# Patient Record
Sex: FEMALE | Race: BLACK OR AFRICAN AMERICAN | Employment: FULL TIME | URBAN - NONMETROPOLITAN AREA
[De-identification: names, ages, dates, MRNs, and addresses within clinical notes are randomized per-mention and may not be internally consistent; named-entity substitution may affect disease eponyms.]

---

## 2022-11-29 ENCOUNTER — HOSPITAL ENCOUNTER (EMERGENCY)
Age: 47
Discharge: HOME | End: 2022-11-29
Attending: EMERGENCY MEDICINE
Payer: COMMERCIAL

## 2022-11-29 ENCOUNTER — APPOINTMENT (OUTPATIENT)
Dept: GENERAL RADIOLOGY | Age: 47
End: 2022-11-29
Attending: EMERGENCY MEDICINE
Payer: COMMERCIAL

## 2022-11-29 ENCOUNTER — APPOINTMENT (OUTPATIENT)
Dept: CT IMAGING | Age: 47
End: 2022-11-29
Attending: EMERGENCY MEDICINE
Payer: COMMERCIAL

## 2022-11-29 VITALS
HEART RATE: 77 BPM | WEIGHT: 170 LBS | OXYGEN SATURATION: 99 % | TEMPERATURE: 98.2 F | SYSTOLIC BLOOD PRESSURE: 146 MMHG | RESPIRATION RATE: 16 BRPM | BODY MASS INDEX: 32.1 KG/M2 | HEIGHT: 61 IN | DIASTOLIC BLOOD PRESSURE: 99 MMHG

## 2022-11-29 DIAGNOSIS — H81.399 PERIPHERAL VERTIGO, UNSPECIFIED LATERALITY: Primary | ICD-10-CM

## 2022-11-29 LAB
ALBUMIN SERPL-MCNC: 3.6 G/DL (ref 3.4–5)
ALBUMIN/GLOB SERPL: 0.8 {RATIO} (ref 0.8–1.7)
ALP SERPL-CCNC: 65 U/L (ref 45–117)
ALT SERPL-CCNC: 17 U/L (ref 13–56)
ANION GAP SERPL CALC-SCNC: 6 MMOL/L (ref 3–18)
APPEARANCE UR: ABNORMAL
AST SERPL W P-5'-P-CCNC: 13 U/L (ref 10–38)
BACTERIA URNS QL MICRO: ABNORMAL /HPF
BASOPHILS # BLD: 0 K/UL (ref 0–0.1)
BASOPHILS NFR BLD: 0 % (ref 0–2)
BILIRUB SERPL-MCNC: 0.5 MG/DL (ref 0.2–1)
BILIRUB UR QL: ABNORMAL
BUN SERPL-MCNC: 10 MG/DL (ref 7–18)
BUN/CREAT SERPL: 11 (ref 12–20)
CA-I BLD-MCNC: 9.2 MG/DL (ref 8.5–10.1)
CHLORIDE SERPL-SCNC: 104 MMOL/L (ref 100–111)
CO2 SERPL-SCNC: 27 MMOL/L (ref 21–32)
COLOR UR: ABNORMAL
CREAT SERPL-MCNC: 0.95 MG/DL (ref 0.6–1.3)
DIFFERENTIAL METHOD BLD: ABNORMAL
EOSINOPHIL # BLD: 0 K/UL (ref 0–0.4)
EOSINOPHIL NFR BLD: 1 % (ref 0–5)
EPITH CASTS URNS QL MICRO: ABNORMAL /LPF (ref 0–20)
ERYTHROCYTE [DISTWIDTH] IN BLOOD BY AUTOMATED COUNT: 12.1 % (ref 11.6–14.5)
GLOBULIN SER CALC-MCNC: 4.6 G/DL (ref 2–4)
GLUCOSE SERPL-MCNC: 123 MG/DL (ref 74–99)
GLUCOSE UR STRIP.AUTO-MCNC: NEGATIVE MG/DL
HCT VFR BLD AUTO: 42.3 % (ref 35–45)
HGB BLD-MCNC: 13.6 G/DL (ref 12–16)
HGB UR QL STRIP: ABNORMAL
IMM GRANULOCYTES # BLD AUTO: 0 K/UL (ref 0–0.04)
IMM GRANULOCYTES NFR BLD AUTO: 0 % (ref 0–0.5)
KETONES UR QL STRIP.AUTO: NEGATIVE MG/DL
LEUKOCYTE ESTERASE UR QL STRIP.AUTO: ABNORMAL
LYMPHOCYTES # BLD: 1.1 K/UL (ref 0.9–3.6)
LYMPHOCYTES NFR BLD: 14 % (ref 21–52)
MAGNESIUM SERPL-MCNC: 1.9 MG/DL (ref 1.6–2.6)
MCH RBC QN AUTO: 31.2 PG (ref 24–34)
MCHC RBC AUTO-ENTMCNC: 32.2 G/DL (ref 31–37)
MCV RBC AUTO: 97 FL (ref 78–100)
MONOCYTES # BLD: 0.2 K/UL (ref 0.05–1.2)
MONOCYTES NFR BLD: 3 % (ref 3–10)
NEUTS SEG # BLD: 6.9 K/UL (ref 1.8–8)
NEUTS SEG NFR BLD: 82 % (ref 40–73)
NITRITE UR QL STRIP.AUTO: POSITIVE
NRBC # BLD: 0 K/UL (ref 0–0.01)
NRBC BLD-RTO: 0 PER 100 WBC
PH UR STRIP: 5 [PH] (ref 5–8)
PLATELET # BLD AUTO: 241 K/UL (ref 135–420)
PMV BLD AUTO: 11.6 FL (ref 9.2–11.8)
POTASSIUM SERPL-SCNC: 3.8 MMOL/L (ref 3.5–5.5)
PROT SERPL-MCNC: 8.2 G/DL (ref 6.4–8.2)
PROT UR STRIP-MCNC: 100 MG/DL
RBC # BLD AUTO: 4.36 M/UL (ref 4.2–5.3)
RBC #/AREA URNS HPF: >100 /HPF (ref 0–2)
SODIUM SERPL-SCNC: 137 MMOL/L (ref 136–145)
SP GR UR REFRACTOMETRY: 1.01 (ref 1–1.03)
UROBILINOGEN UR QL STRIP.AUTO: 0.2 EU/DL (ref 0.2–1)
WBC # BLD AUTO: 8.3 K/UL (ref 4.6–13.2)
WBC URNS QL MICRO: ABNORMAL /HPF (ref 0–4)

## 2022-11-29 PROCEDURE — 93005 ELECTROCARDIOGRAM TRACING: CPT

## 2022-11-29 PROCEDURE — 71045 X-RAY EXAM CHEST 1 VIEW: CPT

## 2022-11-29 PROCEDURE — 81001 URINALYSIS AUTO W/SCOPE: CPT

## 2022-11-29 PROCEDURE — 87086 URINE CULTURE/COLONY COUNT: CPT

## 2022-11-29 PROCEDURE — 74011250636 HC RX REV CODE- 250/636: Performed by: EMERGENCY MEDICINE

## 2022-11-29 PROCEDURE — 70450 CT HEAD/BRAIN W/O DYE: CPT

## 2022-11-29 PROCEDURE — 74011000258 HC RX REV CODE- 258: Performed by: EMERGENCY MEDICINE

## 2022-11-29 PROCEDURE — 83735 ASSAY OF MAGNESIUM: CPT

## 2022-11-29 PROCEDURE — 96375 TX/PRO/DX INJ NEW DRUG ADDON: CPT

## 2022-11-29 PROCEDURE — 80053 COMPREHEN METABOLIC PANEL: CPT

## 2022-11-29 PROCEDURE — 99285 EMERGENCY DEPT VISIT HI MDM: CPT

## 2022-11-29 PROCEDURE — 96365 THER/PROPH/DIAG IV INF INIT: CPT

## 2022-11-29 PROCEDURE — 85025 COMPLETE CBC W/AUTO DIFF WBC: CPT

## 2022-11-29 PROCEDURE — 74011250637 HC RX REV CODE- 250/637: Performed by: EMERGENCY MEDICINE

## 2022-11-29 PROCEDURE — 96361 HYDRATE IV INFUSION ADD-ON: CPT

## 2022-11-29 RX ORDER — DIAZEPAM 5 MG/1
5 TABLET ORAL
Status: COMPLETED | OUTPATIENT
Start: 2022-11-29 | End: 2022-11-29

## 2022-11-29 RX ORDER — MECLIZINE HYDROCHLORIDE 25 MG/1
25 TABLET ORAL
Qty: 20 TABLET | Refills: 0 | Status: SHIPPED | OUTPATIENT
Start: 2022-11-29 | End: 2022-12-06

## 2022-11-29 RX ORDER — MECLIZINE HCL 12.5 MG 12.5 MG/1
25 TABLET ORAL
Status: COMPLETED | OUTPATIENT
Start: 2022-11-29 | End: 2022-11-29

## 2022-11-29 RX ORDER — DIAZEPAM 5 MG/1
5 TABLET ORAL
Qty: 8 TABLET | Refills: 0 | Status: SHIPPED | OUTPATIENT
Start: 2022-11-29

## 2022-11-29 RX ORDER — ONDANSETRON 4 MG/1
4 TABLET, ORALLY DISINTEGRATING ORAL
Qty: 10 TABLET | Refills: 0 | Status: SHIPPED | OUTPATIENT
Start: 2022-11-29

## 2022-11-29 RX ORDER — ONDANSETRON 2 MG/ML
4 INJECTION INTRAMUSCULAR; INTRAVENOUS
Status: COMPLETED | OUTPATIENT
Start: 2022-11-29 | End: 2022-11-29

## 2022-11-29 RX ORDER — SULFAMETHOXAZOLE AND TRIMETHOPRIM 800; 160 MG/1; MG/1
1 TABLET ORAL 2 TIMES DAILY
Qty: 14 TABLET | Refills: 0 | Status: SHIPPED | OUTPATIENT
Start: 2022-11-29 | End: 2022-12-01

## 2022-11-29 RX ADMIN — SODIUM CHLORIDE 1000 ML: 9 INJECTION, SOLUTION INTRAVENOUS at 12:47

## 2022-11-29 RX ADMIN — CEFTRIAXONE 1 G: 1 INJECTION, POWDER, FOR SOLUTION INTRAMUSCULAR; INTRAVENOUS at 16:30

## 2022-11-29 RX ADMIN — MECLIZINE 25 MG: 12.5 TABLET ORAL at 16:30

## 2022-11-29 RX ADMIN — SODIUM CHLORIDE 1000 ML: 9 INJECTION, SOLUTION INTRAVENOUS at 16:30

## 2022-11-29 RX ADMIN — DIAZEPAM 5 MG: 5 TABLET ORAL at 14:47

## 2022-11-29 RX ADMIN — ONDANSETRON 4 MG: 2 INJECTION INTRAMUSCULAR; INTRAVENOUS at 12:46

## 2022-11-29 RX ADMIN — MECLIZINE 25 MG: 12.5 TABLET ORAL at 12:47

## 2022-11-29 NOTE — LETTER
[facility] EMERGENCY DEPT  [address] [address]  [phone]    Work/School Note    Date: 11/29/2022    To Whom It May concern:    Broderick Graham was seen and treated today in the emergency room by the following provider(s):  No providers found. Broderick Graham is excused from work/school on 11/29/2022 through 12/1/2022. She is medically clear to return to work/school on 12/2/2022.      [unfilled]    Sincerely,          Omid Izquierdo

## 2022-11-29 NOTE — ED NOTES
Pt c/o being dizzy while standing to do orthostatic B/P, pt expressed concern for taking meds \"on an empty stomach\", given crackers to pt.  Family at bedside assisting pt with needs

## 2022-11-29 NOTE — LETTER
[facility] EMERGENCY DEPT  [address] [id number] [phone]    Work/School Note    Date: 11/29/2022    To Whom It May concern:    Luisa Nick was seen and treated today in the emergency room by the following provider(s):  Attending Provider: Jimmy Bills MD.      Luisa Nick is excused from work/school on 11/29/22 and 11/30/22. She is medically clear to return to work/school on 12/1/2022.    [unfilled]    Sincerely,          Moses Roach

## 2022-11-30 LAB
ATRIAL RATE: 74 BPM
CALCULATED P AXIS, ECG09: 46 DEGREES
CALCULATED R AXIS, ECG10: 7 DEGREES
CALCULATED T AXIS, ECG11: 36 DEGREES
DIAGNOSIS, 93000: NORMAL
P-R INTERVAL, ECG05: 114 MS
Q-T INTERVAL, ECG07: 396 MS
QRS DURATION, ECG06: 83 MS
QTC CALCULATION (BEZET), ECG08: 446 MS
VENTRICULAR RATE, ECG03: 76 BPM

## 2022-12-01 ENCOUNTER — TELEPHONE (OUTPATIENT)
Dept: FAMILY MEDICINE CLINIC | Age: 47
End: 2022-12-01

## 2022-12-01 ENCOUNTER — OFFICE VISIT (OUTPATIENT)
Dept: FAMILY MEDICINE CLINIC | Age: 47
End: 2022-12-01
Payer: COMMERCIAL

## 2022-12-01 VITALS
RESPIRATION RATE: 18 BRPM | SYSTOLIC BLOOD PRESSURE: 142 MMHG | HEIGHT: 61 IN | TEMPERATURE: 98.4 F | HEART RATE: 85 BPM | OXYGEN SATURATION: 99 % | BODY MASS INDEX: 32.1 KG/M2 | DIASTOLIC BLOOD PRESSURE: 84 MMHG | WEIGHT: 170 LBS

## 2022-12-01 DIAGNOSIS — R42 DIZZINESS: ICD-10-CM

## 2022-12-01 DIAGNOSIS — Z09 HOSPITAL DISCHARGE FOLLOW-UP: ICD-10-CM

## 2022-12-01 DIAGNOSIS — Z56.0 OUT OF WORK: ICD-10-CM

## 2022-12-01 DIAGNOSIS — N39.0 URINARY TRACT INFECTION WITHOUT HEMATURIA, SITE UNSPECIFIED: Primary | ICD-10-CM

## 2022-12-01 PROBLEM — Z98.890 HX OF LEFT KNEE SURGERY: Status: ACTIVE | Noted: 2022-12-01

## 2022-12-01 LAB
BACTERIA SPEC CULT: NORMAL
COLONY COUNT,CNT: NORMAL
COLONY COUNT,CNT: NORMAL
SPECIAL REQUESTS,SREQ: NORMAL

## 2022-12-01 PROCEDURE — 99214 OFFICE O/P EST MOD 30 MIN: CPT | Performed by: NURSE PRACTITIONER

## 2022-12-01 RX ORDER — ERGOCALCIFEROL 1.25 MG/1
CAPSULE ORAL
COMMUNITY

## 2022-12-01 RX ORDER — PREDNISONE 20 MG/1
TABLET ORAL
COMMUNITY
Start: 2022-11-29

## 2022-12-01 RX ORDER — CIPROFLOXACIN 500 MG/1
500 TABLET ORAL 2 TIMES DAILY
Qty: 20 TABLET | Refills: 0 | Status: SHIPPED | OUTPATIENT
Start: 2022-12-01 | End: 2022-12-01 | Stop reason: ALTCHOICE

## 2022-12-01 RX ORDER — MECLIZINE HCL 12.5 MG 12.5 MG/1
TABLET ORAL
COMMUNITY
Start: 2022-11-29 | End: 2022-12-05 | Stop reason: DRUGHIGH

## 2022-12-01 RX ORDER — NITROFURANTOIN 25; 75 MG/1; MG/1
100 CAPSULE ORAL 2 TIMES DAILY
Qty: 20 CAPSULE | Refills: 0 | Status: SHIPPED | OUTPATIENT
Start: 2022-12-01

## 2022-12-01 SDOH — ECONOMIC STABILITY - INCOME SECURITY: UNEMPLOYMENT, UNSPECIFIED: Z56.0

## 2022-12-01 NOTE — TELEPHONE ENCOUNTER
Patients  called stating the cipro you prescribed for her today the pills are to big and she has a fear of large pills, he wanted to know if there was something smaller you could prescribe for her.      Husbands call back info; [phone]

## 2022-12-01 NOTE — PROGRESS NOTES
History of Present Illness  Angela Lay is a 49 y.o. female who presents today for:    Chief Complaint   Patient presents with    Dizziness     Pt has been having dizziness since 11/29/2022 pt also has been feeling weak feeling as if she is going to pass out and pain in left arm. Past Medical History  History reviewed. No pertinent past medical history. Surgical History  Past Surgical History:   Procedure Laterality Date    HX GYN      HX ORTHOPAEDIC          Current Medications  Current Outpatient Medications   Medication Sig    ergocalciferol (ERGOCALCIFEROL) 1,250 mcg (50,000 unit) capsule     predniSONE (DELTASONE) 20 mg tablet TAKE 3 TABLETS BY MOUTH EVERY MORNING FOR 3 DAYS THEN TAKE 2 TABLETS EVERY MORNING FOR 3 DAYS THEN TAKE 1 TABLET EVERY MORNING FOR 3 DAYS    nitrofurantoin, macrocrystal-monohydrate, (MACROBID) 100 mg capsule Take 1 Capsule by mouth two (2) times a day. meclizine (ANTIVERT) 25 mg tablet Take 1 Tablet by mouth three (3) times daily as needed for Dizziness for up to 7 days. diazePAM (Valium) 5 mg tablet Take 1 Tablet by mouth every twelve (12) hours as needed for Anxiety. Max Daily Amount: 10 mg.    ondansetron (ZOFRAN ODT) 4 mg disintegrating tablet Take 1 Tablet by mouth every eight (8) hours as needed for Nausea or Vomiting.    meclizine (ANTIVERT) 12.5 mg tablet TAKE 1 TO 2 TABLETS BY MOUTH EVERY 6 HOURS AS NEEDED FOR VERTIGO (Patient not taking: Reported on 12/1/2022)     No current facility-administered medications for this visit. Allergies/Drug Reactions  No Known Allergies     Family History  History reviewed. No pertinent family history.      Social History  Social History     Tobacco Use    Smoking status: Never    Smokeless tobacco: Never   Substance Use Topics    Alcohol use: Not Currently    Drug use: Not Currently        Health Maintenance   Topic Date Due    Hepatitis C Screening  Never done    COVID-19 Vaccine (1) Never done    DTaP/Tdap/Td series (1 - Tdap) Never done    Lipid Screen  Never done    Colorectal Cancer Screening Combo  Never done    Flu Vaccine (1) Never done    Depression Screen  12/01/2023    Cervical cancer screen  12/09/2024    Pneumococcal 0-64 years  Aged Out     Physical Exam  Vital signs:   Vitals:    12/01/22 1310 12/01/22 1313   BP: (!) 144/82 (!) 142/84   Pulse: 80 85   Resp: 18 18   Temp: 98.4 °F (36.9 °C)    TempSrc: Temporal    SpO2:  99%   Weight: 170 lb (77.1 kg)    Height: 5' 1\" (1.549 m)      Laboratory/Tests:  Admission on 11/29/2022, Discharged on 11/29/2022   Component Date Value Ref Range Status    WBC 11/29/2022 8.3  4.6 - 13.2 K/uL Final    RBC 11/29/2022 4.36  4.20 - 5.30 M/uL Final    HGB 11/29/2022 13.6  12.0 - 16.0 g/dL Final    HCT 11/29/2022 42.3  35.0 - 45.0 % Final    MCV 11/29/2022 97.0  78.0 - 100.0 FL Final    MCH 11/29/2022 31.2  24.0 - 34.0 PG Final    MCHC 11/29/2022 32.2  31.0 - 37.0 g/dL Final    RDW 11/29/2022 12.1  11.6 - 14.5 % Final    PLATELET 98/53/5197 592  135 - 420 K/uL Final    MPV 11/29/2022 11.6  9.2 - 11.8 FL Final    NRBC 11/29/2022 0.0  0.0  WBC Final    ABSOLUTE NRBC 11/29/2022 0.00  0.00 - 0.01 K/uL Final    NEUTROPHILS 11/29/2022 82 (A)  40 - 73 % Final    LYMPHOCYTES 11/29/2022 14 (A)  21 - 52 % Final    MONOCYTES 11/29/2022 3  3 - 10 % Final    EOSINOPHILS 11/29/2022 1  0 - 5 % Final    BASOPHILS 11/29/2022 0  0 - 2 % Final    IMMATURE GRANULOCYTES 11/29/2022 0  0 - 0.5 % Final    ABS. NEUTROPHILS 11/29/2022 6.9  1.8 - 8.0 K/UL Final    ABS. LYMPHOCYTES 11/29/2022 1.1  0.9 - 3.6 K/UL Final    ABS. MONOCYTES 11/29/2022 0.2  0.05 - 1.2 K/UL Final    ABS. EOSINOPHILS 11/29/2022 0.0  0.0 - 0.4 K/UL Final    ABS. BASOPHILS 11/29/2022 0.0  0.0 - 0.1 K/UL Final    ABS. IMM.  GRANS. 11/29/2022 0.0  0.00 - 0.04 K/UL Final    DF 11/29/2022 AUTOMATED    Final    Magnesium 11/29/2022 1.9  1.6 - 2.6 mg/dL Final    Sodium 11/29/2022 137  136 - 145 mmol/L Final    Potassium 11/29/2022 3.8  3.5 - 5.5 mmol/L Final    Chloride 11/29/2022 104  100 - 111 mmol/L Final    CO2 11/29/2022 27  21 - 32 mmol/L Final    Anion gap 11/29/2022 6  3.0 - 18.0 mmol/L Final    Glucose 11/29/2022 123 (A)  74 - 99 mg/dL Final    BUN 11/29/2022 10  7 - 18 mg/dL Final    Creatinine 11/29/2022 0.95  0.60 - 1.30 mg/dL Final    BUN/Creatinine ratio 11/29/2022 11 (A)  12 - 20   Final    eGFR 11/29/2022 >60  >60 ml/min/1.73m2 Final    Comment:    Pediatric calculator link: [employer].at. org/professionals/kdoqi/gfr_calculatorped    Effective Oct 3, 2022    These results are not intended for use in patients <25years of age. eGFR results are calculated without a race factor using  the 2021 CKD-EPI equation. Careful clinical correlation is recommended, particularly when comparing to results calculated using previous equations. The CKD-EPI equation is less accurate in patients with extremes of muscle mass, extra-renal metabolism of creatinine, excessive creatine ingestion, or following therapy that affects renal tubular secretion. Calcium 11/29/2022 9.2  8.5 - 10.1 mg/dL Final    Bilirubin, total 11/29/2022 0.5  0.2 - 1.0 mg/dL Final    AST (SGOT) 11/29/2022 13  10 - 38 U/L Final    ALT (SGPT) 11/29/2022 17  13 - 56 U/L Final    Alk.  phosphatase 11/29/2022 65  45 - 117 U/L Final    Protein, total 11/29/2022 8.2  6.4 - 8.2 g/dL Final    Albumin 11/29/2022 3.6  3.4 - 5.0 g/dL Final    Globulin 11/29/2022 4.6 (A)  2.0 - 4.0 g/dL Final    A-G Ratio 11/29/2022 0.8  0.8 - 1.7   Final    Color 11/29/2022 Red    Final    Color Reference Range: Straw, Yellow or Dark Yellow    Appearance 11/29/2022 Turbid    Final    Specific gravity 11/29/2022 1.010  1.005 - 1.030   Final    pH (UA) 11/29/2022 5.0  5.0 - 8.0   Final    Protein 11/29/2022 100 (A)  Negative mg/dL Final    Glucose 11/29/2022 Negative  Negative mg/dL Final    Ketone 11/29/2022 Negative  Negative mg/dL Final    Bilirubin 11/29/2022 Moderate (A)  Negative Final    Blood 11/29/2022 Moderate (A)  Negative   Final    Urobilinogen 11/29/2022 0.2  0.2 - 1.0 EU/dL Final    Nitrites 11/29/2022 Positive (A)  Negative   Final    Leukocyte Esterase 11/29/2022 Large (A)  Negative   Final    Ventricular Rate 11/29/2022 76  BPM Final    Atrial Rate 11/29/2022 74  BPM Final    P-R Interval 11/29/2022 114  ms Final    QRS Duration 11/29/2022 83  ms Final    Q-T Interval 11/29/2022 396  ms Final    QTC Calculation (Bezet) 11/29/2022 446  ms Final    Calculated P Axis 11/29/2022 46  degrees Final    Calculated R Axis 11/29/2022 7  degrees Final    Calculated T Axis 11/29/2022 36  degrees Final    Diagnosis 11/29/2022    Final                    Value:Sinus rhythm  Borderline short MD interval    Confirmed by Toya Weaver ([id number]) on 11/30/2022 4:31:23 PM      WBC 11/29/2022 5-10  0 - 4 /hpf Final    RBC 11/29/2022 >100 (A)  0 - 2 /hpf Final    Epithelial cells 11/29/2022 Moderate  0 - 20 /lpf Final    Epithelial cell category consists of squamous cells and/or transitional urothelial cells. Renal tubular cells, if present, are separately identified as such. Bacteria 11/29/2022 2+ (A)  None /hpf Final    Special Requests: 11/29/2022 No Special Requests    Final    [place] Count 11/29/2022 50,000    Final    [place] Count 11/29/2022 colonies/ml    Final    Culture result: 11/29/2022 Mixed urogenital billie isolated    Final     Patient reported to ED on 11/29/2022 with chief complaint of dizziness with 2-day history. She reported the room felt like it was spinning. She also reported fullness in ears. Scan on 11/29/2022 of head revealed no abnormalities other than white matter disease. Chest x-ray was negative for acute process. During ED visit patient symptoms improved with hydration, Meclizine and Valium. At discharge she reported she was slightly dizzy but stable enough to return home.   Patient was given out of work note for 3 days and advised if she needed a work note for greater than 30 days she would need to follow-up with PCP. Patient's urine was found to be positive for leukocyte esterase and Nitrates on 11/29/2022 during ED visit and she was prescribed Bactrim 160/800 mg tablet twice daily for 7 days. Assessment/Plan:    Hospital discharge follow-up. Performed hospital discharge follow-up and medication reconciliation. Dizziness. Continue Meclizine 12.5 mg tablet 3 times daily as needed for management of dizziness. Patient referred to ENT at this visit. UTI. Prescribed Nitrofurantoin 100 mg capsule twice daily for management of UTI. Out of work. Drafted, signed and given to the patient during visit and examination room out of work note for patient return to work on 12/19/2022. I have discussed the diagnosis with the patient and the intended plan as seen in the above orders. The patient has received an after-visit summary and questions were answered concerning future plans. I have discussed medication side effects and warnings with the patient as well. I have reviewed the plan of care with the patient, accepted their input and they are in agreement with the treatment goals.        Antonio Powell NP  December 5, 2022

## 2022-12-01 NOTE — LETTER
NOTIFICATION RETURN TO WORK    12/1/2022 1:55 PM    Ms. [address] [place] [address]      To Whom It May Concern:    Marrianne Schilder is currently under the care of [address] [place] [address]/[facility]. She will return to work December 19, 2022. If there are questions or concerns please have the patient contact our office.         Sincerely,      Laurel March NP

## 2022-12-01 NOTE — LETTER
NOTIFICATION RETURN TO WORK    12/16/2022 10:10 AM    Ms. [address] [address]      To Whom It May Concern:    Contreras Nichole is currently under the care of [address] [place] [address]/[facility]. She will return to work on January 9, 2023. If there are questions or concerns please have the patient contact our office.         Sincerely,      Graciela Fontenot NP

## 2022-12-01 NOTE — PROGRESS NOTES
Contreras Nichole presents today for   Chief Complaint   Patient presents with    Dizziness     Pt has been having dizziness since 11/29/2022 pt also has been feeling weak feeling as if she is going to pass out and pain in left arm. Is someone accompanying this pt? Yes,      Is the patient using any DME equipment during [address]? wheelchair    Depression Screening:  3 most recent PHQ Screens 12/1/2022   Little interest or pleasure in doing things Not at all   Feeling down, depressed, irritable, or hopeless Not at all   Total Score PHQ 2 0       Learning Assessment:  No flowsheet data found. Health Maintenance reviewed and discussed and ordered per Provider. Health Maintenance Due   Topic Date Due    Hepatitis C Screening  Never done    Depression Screen  Never done    COVID-19 Vaccine (1) Never done    DTaP/Tdap/Td series (1 - Tdap) Never done    Lipid Screen  Never done    Colorectal Cancer Screening Combo  Never done    Flu Vaccine (1) Never done   . Coordination of Care:  1. \"Have you been to the ER, urgent care clinic since your last visit? Hospitalized since your last visit? \" Yes When: 11/29/2022 Where: [facility] Reason for visit: dizziness     2. \"Have you seen or consulted any other health care providers outside of the [address] [place] [address] since your last visit? \" No     3. For patients aged 39-70: Has the patient had a colonoscopy? No     If the patient is female:    4. For patients aged 41-77: Has the patient had a mammogram within the past 2 years? No    5. For patients aged 21-65: Has the patient had a pap smear? Yes - Care Gap present.  Most recent result on file

## 2022-12-01 NOTE — ED PROVIDER NOTES
EMERGENCY DEPARTMENT HISTORY AND PHYSICAL EXAM      Date: 11/29/2022  Patient Name: Etelvina Taylor    History of Presenting Illness     Chief Complaint   Patient presents with    Dizziness       History Provided By: Patient and Patient's     HPI: Etelvina Taylor, 49 y.o. female presents to ED via EMS complaining of dizziness. Patient reports that she has been having dizziness since 2 days ago. Today she states that she woke up very dizzy, describes that the dizziness is spinning. She states that she was unable to walk by herself, she thinks she may have passed out. Dizziness associated with mild headache, no visual changes, no nausea or vomiting. She also reports fullness in the ears with no pain. She denies sinus pain. She denies fever, chills, numbness or tingling of hands or feet. There are no other complaints, changes, or physical findings at this time. Past History   Past Medical History:  History reviewed. No pertinent past medical history. Past Surgical History:  Past Surgical History:   Procedure Laterality Date    HX GYN      HX ORTHOPAEDIC         Family History:  History reviewed. No pertinent family history. Social History:  Social History     Tobacco Use    Smoking status: Never    Smokeless tobacco: Never   Substance Use Topics    Alcohol use: Not Currently    Drug use: Not Currently       Allergies:  No Known Allergies    PCP: Booker Tian NP    Current Outpatient Medications   Medication Sig Dispense Refill    meclizine (ANTIVERT) 25 mg tablet Take 1 Tablet by mouth three (3) times daily as needed for Dizziness for up to 7 days. 20 Tablet 0    diazePAM (Valium) 5 mg tablet Take 1 Tablet by mouth every twelve (12) hours as needed for Anxiety. Max Daily Amount: 10 mg. 8 Tablet 0    ondansetron (ZOFRAN ODT) 4 mg disintegrating tablet Take 1 Tablet by mouth every eight (8) hours as needed for Nausea or Vomiting.  10 Tablet 0 trimethoprim-sulfamethoxazole (Bactrim DS) 160-800 mg per tablet Take 1 Tablet by mouth two (2) times a day for 7 days. 14 Tablet 0     Review of Systems   Review of Systems   Constitutional:  Positive for activity change and fatigue. Negative for chills and fever. HENT:  Negative for congestion and sore throat. Respiratory:  Negative for cough and shortness of breath. Cardiovascular:  Negative for chest pain. Gastrointestinal:  Negative for abdominal distention, nausea and vomiting. Genitourinary:  Negative for difficulty urinating, dysuria, flank pain, frequency, hematuria, urgency, vaginal bleeding and vaginal discharge. Musculoskeletal:  Negative for arthralgias and joint swelling. Skin:  Negative for rash and wound. Neurological:  Positive for dizziness and weakness. Negative for light-headedness and headaches. Hematological:  Negative for adenopathy. Physical Exam   Physical Exam  Vitals and nursing note reviewed. Constitutional:       General: She is not in acute distress. Appearance: She is well-developed. She is not diaphoretic. Comments: Patient appears in no acute distress. Nontoxic. HENT:      Head: Normocephalic and atraumatic. Jaw: No trismus. Right Ear: External ear normal. No swelling or tenderness. Tympanic membrane is not perforated, erythematous or bulging. Left Ear: External ear normal. No swelling or tenderness. Tympanic membrane is not perforated, erythematous or bulging. Ears:      Comments: Patient reports she feels fullness in ears. Nose: Nose normal. No mucosal edema or rhinorrhea. Right Sinus: No maxillary sinus tenderness or frontal sinus tenderness. Left Sinus: No maxillary sinus tenderness or frontal sinus tenderness. Mouth/Throat:      Mouth: No oral lesions. Dentition: No dental abscesses. Pharynx: Uvula midline. No oropharyngeal exudate, posterior oropharyngeal erythema or uvula swelling. Tonsils: No tonsillar abscesses. Eyes:      General: No scleral icterus. Right eye: No discharge. Left eye: No discharge. Conjunctiva/sclera: Conjunctivae normal.      Comments: Bilateral horizontal nystagmus   Cardiovascular:      Rate and Rhythm: Normal rate and regular rhythm. Heart sounds: Normal heart sounds. No murmur heard. No friction rub. No gallop. Pulmonary:      Effort: Pulmonary effort is normal. No tachypnea, accessory muscle usage or respiratory distress. Breath sounds: Normal breath sounds. No decreased breath sounds, wheezing, rhonchi or rales. Abdominal:      General: Bowel sounds are normal.      Palpations: Abdomen is soft. Musculoskeletal:         General: No tenderness. Normal range of motion. Cervical back: Normal range of motion and neck supple. Lymphadenopathy:      Cervical: No cervical adenopathy. Skin:     General: Skin is warm and dry. Findings: No erythema or rash. Neurological:      General: No focal deficit present. Mental Status: She is alert and oriented to person, place, and time. Cranial Nerves: No cranial nerve deficit. Sensory: No sensory deficit. Psychiatric:         Judgment: Judgment normal.       Lab and Diagnostic Study Results   Labs -   No results found for this or any previous visit (from the past 12 hour(s)). Radiologic Studies -   [unfilled]  CT Results  (Last 48 hours)                 11/29/22 1315  CT HEAD WO CONT Final result    Impression:      White matter disease greater than expected for age. This is most commonly seen   in the setting of chronic systemic hypertension. Otherwise negative. Narrative:  EXAM: CT HEAD WO CONT       CLINICAL INDICATION/HISTORY: syncope/dizziness     > Additional: None       COMPARISON: None.     > Correlative imaging: None. TECHNIQUE: Volumetric scanning without IV contrast.  Sagittal and coronal   reconstructions.  One or more dose reduction techniques were used on this CT:   automated exposure control, adjustment of the mAs and/or kVp according to   patient size, and iterative reconstruction techniques. The specific techniques   used on this CT exam have been documented in the patient's electronic medical   record. Digital imaging and communications in medicine (DICOM) format image data   are available to nonaffiliated external healthcare facilities or entities on a   secure, media free, reciprocally searchable basis with patient authorization for   at least a 12 month period after this study. _______________       FINDINGS:       BRAIN:      > Brain volume: Age appropriate.      > White matter disease:         > Severity: Moderate.         > Relative to age: Greater than expected for age.     > Infarcts, encephalomalacia: None.     > Parenchymal mass: None.      > Parenchymal hemorrhage: None.      > Midline shift: None.      > Miscellaneous: None. EXTRA-AXIAL SPACES: Unremarkable. No fluid collections. CALVARIUM: Intact. SINUSES, MASTOIDS: Clear. OTHER EXTRACRANIAL: Unremarkable.       _______________                 CXR Results  (Last 48 hours)                 11/29/22 1319  XR CHEST PORT Final result    Impression:      No significant abnormality. Narrative:  History: Dizziness   Views: AP erect 1312 hours. Comparison study: None. Findings:       Heart and mediastinum: Unremarkable. Lungs and pleura: Clear without consolidation or pleural effusion. Aorta: Unremarkable. Bones: Minimal levoscoliosis thoracic spine. Medical Decision Making and ED Course   Differential Diagnosis & Medical Decision Making Provider Note:   Peripheral vertigo, vestibulitis, viral illness, complex migraine, TIA, neuropathy, medication reaction    - I am the first and primary provider for this patient.  I reviewed the vital signs, available nursing notes, past medical history, past surgical history, family history and social history. The patient's presenting problems have been discussed, and the staff are in agreement with the care plan formulated and outlined with them. I have encouraged them to ask questions as they arise throughout their visit. Vital Signs-Reviewed the patient's vital signs. No data found. EKG interpretation: (Preliminary): EKG Interpreted by me. Shows EKG: normal EKG, normal sinus rhythm, there are no previous tracings available for comparison, Rate 76. ED Course:      Patient presents with 2 days of spinning kind dizziness. She his bilateral nystagmus on exam.  CT scan of head is unremarkable. Patient improved with hydration, meclizine as well as Valium. She still slightly dizzy at discharge. However stable for discharge home. She is to follow-up with PCP and was given precautions for returning to ED. Patient wanting 1 week work note which I could not provide, I was able to give her 3 days and advised her to follow-up with PCP for more days if she needed them. Procedures and Critical Care       Disposition   Disposition: Condition stable and improved  DC- Adult Discharges: All of the diagnostic tests were reviewed and questions answered. Diagnosis, care plan and treatment options were discussed. The patient understands the instructions and will follow up as directed. The patients results have been reviewed with them. They have been counseled regarding their diagnosis. The patient verbally convey understanding and agreement of the signs, symptoms, diagnosis, treatment and prognosis and additionally agrees to follow up as recommended with their PCP in 24 - 48 hours. They also agree with the care-plan and convey that all of their questions have been answered.   I have also put together some discharge instructions for them that include: 1) educational information regarding their diagnosis, 2) how to care for their diagnosis at home, as well a 3) list of reasons why they would want to return to the ED prior to their follow-up appointment, should their condition change. DISCHARGE PLAN:  1. Cannot display discharge medications since this patient is not currently admitted. 2.   Follow-up Information       Follow up With Specialties Details Why Contact Info    Kathleen Fernández NP Nurse Practitioner In 2 days  [id number] [facility]      Dale Rodriguez MD Neurology In 1 day  [address]  [address] [place] [address] EMERGENCY DEPT Emergency Medicine  If symptoms worsen [place] [id number]  [phone]          3. Return to ED if worse   4. Discharge Medication List as of 11/29/2022  6:14 PM        START taking these medications    Details   meclizine (ANTIVERT) 25 mg tablet Take 1 Tablet by mouth three (3) times daily as needed for Dizziness for up to 7 days. , Normal, Disp-20 Tablet, R-0      diazePAM (Valium) 5 mg tablet Take 1 Tablet by mouth every twelve (12) hours as needed for Anxiety. Max Daily Amount: 10 mg., Normal, Disp-8 Tablet, R-0      ondansetron (ZOFRAN ODT) 4 mg disintegrating tablet Take 1 Tablet by mouth every eight (8) hours as needed for Nausea or Vomiting., Normal, Disp-10 Tablet, R-0      trimethoprim-sulfamethoxazole (Bactrim DS) 160-800 mg per tablet Take 1 Tablet by mouth two (2) times a day for 7 days. , Normal, Disp-14 Tablet, R-0           Remove if admitted/transferred    Diagnosis/Clinical Impression     Clinical Impression:   1. Peripheral vertigo, unspecified laterality        Attestations: Tuan CHAUDHRY MD, am the primary clinician of record. Please note that this dictation was completed with Qwite, the [employer] voice recognition software. Quite often unanticipated grammatical, syntax, homophones, and other interpretive errors are inadvertently transcribed by the computer software. Please disregard these errors. Please excuse any errors that have escaped final proofreading. Thank you.

## 2022-12-05 DIAGNOSIS — R42 DIZZINESS: Primary | ICD-10-CM

## 2022-12-05 RX ORDER — MECLIZINE HYDROCHLORIDE 25 MG/1
25 TABLET ORAL
Qty: 60 TABLET | Refills: 0 | Status: SHIPPED | OUTPATIENT
Start: 2022-12-05 | End: 2022-12-25

## 2022-12-05 NOTE — TELEPHONE ENCOUNTER
Refilled patient's Meclizine 25 mg tablet take 1 tablet 3 times daily as needed for management of dizziness on 12/5/2022.

## 2022-12-08 ENCOUNTER — HOSPITAL ENCOUNTER (OUTPATIENT)
Dept: GENERAL RADIOLOGY | Age: 47
End: 2022-12-08
Attending: NURSE PRACTITIONER
Payer: COMMERCIAL

## 2022-12-08 ENCOUNTER — TELEPHONE (OUTPATIENT)
Dept: FAMILY MEDICINE CLINIC | Age: 47
End: 2022-12-08

## 2022-12-08 DIAGNOSIS — M54.2 CERVICALGIA: Primary | ICD-10-CM

## 2022-12-08 DIAGNOSIS — M54.2 CERVICALGIA: ICD-10-CM

## 2022-12-08 PROCEDURE — 72040 X-RAY EXAM NECK SPINE 2-3 VW: CPT

## 2022-12-16 ENCOUNTER — TELEPHONE (OUTPATIENT)
Dept: FAMILY MEDICINE CLINIC | Age: 47
End: 2022-12-16

## 2022-12-16 NOTE — TELEPHONE ENCOUNTER
Patient states she has has been calling all week to try and get another letter from her pcp to extend her time from work due to her still being dizzy. She is supposed to be back on at work on the 19th but states she physically cannot.

## 2022-12-16 NOTE — TELEPHONE ENCOUNTER
----- Message from Trinidad Pedro sent at 12/15/2022 11:29 AM EST -----  Subject: Message to Provider    QUESTIONS  Information for Provider? Patient is calling in as she is still Dizzy and   weak. Patient is due to go back to work on the 19th, however she is still   not feeling up to it. Patient would like to know if the provider could add   more days to her return to work paper work or if she needs to come in and   be seen again. She would also like to know what is happening with the   therapy referral as she has not heard anything about it or received the   Neck X-ray results from last Thursday. Please call and advise.  ---------------------------------------------------------------------------  --------------  Sheila BOWSER  [phone]; OK to leave message on voicemail  ---------------------------------------------------------------------------  --------------  SCRIPT ANSWERS  Relationship to Patient?  Self

## 2022-12-16 NOTE — TELEPHONE ENCOUNTER
Spoke with patient via phone call on 12/16/2022 drafted and signed return to work letter for January 9, 2023.   Patient reports she is yet to have schedule for ENT and they reports she may have not received patient's referral.

## 2022-12-27 ENCOUNTER — OFFICE VISIT (OUTPATIENT)
Dept: FAMILY MEDICINE CLINIC | Age: 47
End: 2022-12-27
Payer: COMMERCIAL

## 2022-12-27 VITALS
WEIGHT: 166.8 LBS | BODY MASS INDEX: 31.49 KG/M2 | SYSTOLIC BLOOD PRESSURE: 139 MMHG | OXYGEN SATURATION: 100 % | DIASTOLIC BLOOD PRESSURE: 77 MMHG | RESPIRATION RATE: 18 BRPM | TEMPERATURE: 97.9 F | HEART RATE: 86 BPM | HEIGHT: 61 IN

## 2022-12-27 DIAGNOSIS — G44.011 INTRACTABLE EPISODIC CLUSTER HEADACHE: Primary | ICD-10-CM

## 2022-12-27 DIAGNOSIS — E55.9 VITAMIN D DEFICIENCY: ICD-10-CM

## 2022-12-27 DIAGNOSIS — R42 DIZZINESS: ICD-10-CM

## 2022-12-27 PROCEDURE — 99214 OFFICE O/P EST MOD 30 MIN: CPT | Performed by: NURSE PRACTITIONER

## 2022-12-27 RX ORDER — VERAPAMIL HYDROCHLORIDE 120 MG/1
120 TABLET, FILM COATED ORAL 2 TIMES DAILY
Qty: 60 TABLET | Refills: 1 | Status: SHIPPED | OUTPATIENT
Start: 2022-12-27

## 2022-12-27 RX ORDER — PREDNISONE 20 MG/1
TABLET ORAL
Qty: 23 TABLET | Refills: 0 | Status: SHIPPED | OUTPATIENT
Start: 2022-12-27

## 2022-12-27 RX ORDER — ERGOCALCIFEROL 1.25 MG/1
50000 CAPSULE ORAL
Qty: 13 CAPSULE | Refills: 3 | Status: SHIPPED | OUTPATIENT
Start: 2022-12-27

## 2022-12-27 NOTE — PROGRESS NOTES
History of Present Illness  Javi Myers is a 49 y.o. female who presents today for:    Chief Complaint   Patient presents with    Dizziness     Patient is experiencing dizziness, unbalanced, severe headache behind right ear. Started a week ago. Past Medical History  History reviewed. No pertinent past medical history. Surgical History  Past Surgical History:   Procedure Laterality Date    HX GYN      HX ORTHOPAEDIC          Current Medications  Current Outpatient Medications   Medication Sig    verapamiL (CALAN) 120 mg tablet Take 1 Tablet by mouth two (2) times a day. Indications: cluster headache prevention    predniSONE (DELTASONE) 20 mg tablet Take 3 tablets daily for 5 days, then take 2.5 tablet daily for 1 day, then take 2 tablets daily for 1 day, then take 1.5 tablets daily for 1 day, then take tablet daily for 1 day, then take 1/2 tablet daily for 1 day. ergocalciferol (ERGOCALCIFEROL) 1,250 mcg (50,000 unit) capsule Take 1 Capsule by mouth every seven (7) days. diazePAM (Valium) 5 mg tablet Take 1 Tablet by mouth every twelve (12) hours as needed for Anxiety. Max Daily Amount: 10 mg. (Patient not taking: Reported on 12/27/2022)     No current facility-administered medications for this visit. Allergies/Drug Reactions  No Known Allergies     Family History  History reviewed. No pertinent family history.      Social History  Social History     Tobacco Use    Smoking status: Never    Smokeless tobacco: Never   Substance Use Topics    Alcohol use: Not Currently    Drug use: Not Currently        Health Maintenance   Topic Date Due    Hepatitis C Screening  Never done    COVID-19 Vaccine (1) Never done    DTaP/Tdap/Td series (1 - Tdap) Never done    Lipid Screen  Never done    Colorectal Cancer Screening Combo  Never done    Flu Vaccine (1) Never done    Depression Screen  12/01/2023    Cervical cancer screen  12/09/2024    Pneumococcal 0-64 years  Aged Out     Physical Exam  Vital signs:   Vitals:    12/27/22 1515 12/27/22 1517   BP: (!) 147/81 139/77   Pulse: 86    Resp: 18    Temp: 97.9 °F (36.6 °C)    TempSrc: Temporal    SpO2: 100%    Weight: 166 lb 12.8 oz (75.7 kg)    Height: 5' 1\" (1.549 m)        Physical Exam  HENT:      Head:        Comments: Area of posterior right ear pain. Laboratory/Tests:  Admission on 11/29/2022, Discharged on 11/29/2022   Component Date Value Ref Range Status    WBC 11/29/2022 8.3  4.6 - 13.2 K/uL Final    RBC 11/29/2022 4.36  4.20 - 5.30 M/uL Final    HGB 11/29/2022 13.6  12.0 - 16.0 g/dL Final    HCT 11/29/2022 42.3  35.0 - 45.0 % Final    MCV 11/29/2022 97.0  78.0 - 100.0 FL Final    MCH 11/29/2022 31.2  24.0 - 34.0 PG Final    MCHC 11/29/2022 32.2  31.0 - 37.0 g/dL Final    RDW 11/29/2022 12.1  11.6 - 14.5 % Final    PLATELET 48/57/4178 341  135 - 420 K/uL Final    MPV 11/29/2022 11.6  9.2 - 11.8 FL Final    NRBC 11/29/2022 0.0  0.0  WBC Final    ABSOLUTE NRBC 11/29/2022 0.00  0.00 - 0.01 K/uL Final    NEUTROPHILS 11/29/2022 82 (A)  40 - 73 % Final    LYMPHOCYTES 11/29/2022 14 (A)  21 - 52 % Final    MONOCYTES 11/29/2022 3  3 - 10 % Final    EOSINOPHILS 11/29/2022 1  0 - 5 % Final    BASOPHILS 11/29/2022 0  0 - 2 % Final    IMMATURE GRANULOCYTES 11/29/2022 0  0 - 0.5 % Final    ABS. NEUTROPHILS 11/29/2022 6.9  1.8 - 8.0 K/UL Final    ABS. LYMPHOCYTES 11/29/2022 1.1  0.9 - 3.6 K/UL Final    ABS. MONOCYTES 11/29/2022 0.2  0.05 - 1.2 K/UL Final    ABS. EOSINOPHILS 11/29/2022 0.0  0.0 - 0.4 K/UL Final    ABS. BASOPHILS 11/29/2022 0.0  0.0 - 0.1 K/UL Final    ABS. IMM.  GRANS. 11/29/2022 0.0  0.00 - 0.04 K/UL Final    DF 11/29/2022 AUTOMATED    Final    Magnesium 11/29/2022 1.9  1.6 - 2.6 mg/dL Final    Sodium 11/29/2022 137  136 - 145 mmol/L Final    Potassium 11/29/2022 3.8  3.5 - 5.5 mmol/L Final    Chloride 11/29/2022 104  100 - 111 mmol/L Final    CO2 11/29/2022 27  21 - 32 mmol/L Final    Anion gap 11/29/2022 6  3.0 - 18.0 mmol/L Final Glucose 11/29/2022 123 (A)  74 - 99 mg/dL Final    BUN 11/29/2022 10  7 - 18 mg/dL Final    Creatinine 11/29/2022 0.95  0.60 - 1.30 mg/dL Final    BUN/Creatinine ratio 11/29/2022 11 (A)  12 - 20   Final    eGFR 11/29/2022 >60  >60 ml/min/1.73m2 Final    Comment:    Pediatric calculator link: Imani.at. org/professionals/kdoqi/gfr_calculatorped    Effective Oct 3, 2022    These results are not intended for use in patients <25years of age. eGFR results are calculated without a race factor using  the 2021 CKD-EPI equation. Careful clinical correlation is recommended, particularly when comparing to results calculated using previous equations. The CKD-EPI equation is less accurate in patients with extremes of muscle mass, extra-renal metabolism of creatinine, excessive creatine ingestion, or following therapy that affects renal tubular secretion. Calcium 11/29/2022 9.2  8.5 - 10.1 mg/dL Final    Bilirubin, total 11/29/2022 0.5  0.2 - 1.0 mg/dL Final    AST (SGOT) 11/29/2022 13  10 - 38 U/L Final    ALT (SGPT) 11/29/2022 17  13 - 56 U/L Final    Alk.  phosphatase 11/29/2022 65  45 - 117 U/L Final    Protein, total 11/29/2022 8.2  6.4 - 8.2 g/dL Final    Albumin 11/29/2022 3.6  3.4 - 5.0 g/dL Final    Globulin 11/29/2022 4.6 (A)  2.0 - 4.0 g/dL Final    A-G Ratio 11/29/2022 0.8  0.8 - 1.7   Final    Color 11/29/2022 Red    Final    Color Reference Range: Straw, Yellow or Dark Yellow    Appearance 11/29/2022 Turbid    Final    Specific gravity 11/29/2022 1.010  1.005 - 1.030   Final    pH (UA) 11/29/2022 5.0  5.0 - 8.0   Final    Protein 11/29/2022 100 (A)  Negative mg/dL Final    Glucose 11/29/2022 Negative  Negative mg/dL Final    Ketone 11/29/2022 Negative  Negative mg/dL Final    Bilirubin 11/29/2022 Moderate (A)  Negative   Final    Blood 11/29/2022 Moderate (A)  Negative   Final    Urobilinogen 11/29/2022 0.2  0.2 - 1.0 EU/dL Final    Nitrites 11/29/2022 Positive (A)  Negative   Final    Leukocyte Esterase 11/29/2022 Large (A)  Negative   Final    Ventricular Rate 11/29/2022 76  BPM Final    Atrial Rate 11/29/2022 74  BPM Final    P-R Interval 11/29/2022 114  ms Final    QRS Duration 11/29/2022 83  ms Final    Q-T Interval 11/29/2022 396  ms Final    QTC Calculation (Bezet) 11/29/2022 446  ms Final    Calculated P Axis 11/29/2022 46  degrees Final    Calculated R Axis 11/29/2022 7  degrees Final    Calculated T Axis 11/29/2022 36  degrees Final    Diagnosis 11/29/2022    Final                    Value:Sinus rhythm  Borderline short IL interval    Confirmed by Luli Nick ([id number]) on 11/30/2022 4:31:23 PM      WBC 11/29/2022 5-10  0 - 4 /hpf Final    RBC 11/29/2022 >100 (A)  0 - 2 /hpf Final    Epithelial cells 11/29/2022 Moderate  0 - 20 /lpf Final    Epithelial cell category consists of squamous cells and/or transitional urothelial cells. Renal tubular cells, if present, are separately identified as such. Bacteria 11/29/2022 2+ (A)  None /hpf Final    Special Requests: 11/29/2022 No Special Requests    Final    Alba Count 11/29/2022 50,000    Final    Alba Count 11/29/2022 colonies/ml    Final    Culture result: 11/29/2022 Mixed urogenital billie isolated    Final     Patient reports posterior right ear pain. She reports the posterior right ear pain initially would last as sharp pain 20 seconds and now the pain is constant. She reports the initial onset of pain was approximately 1-week ago. She denies history of migraine headaches. She uses OTC Ibuprofen 400 mg which will decrease her right posterior headache pain, but it will not completely resolve. Patient reports when her headache pain is at its worst she may experience excess lacrimation of both eyes. Will prescribe Prednisone and Verapamil for treatment of cluster headaches. Will refer patient to neurology at this visit.     Patient reports some improvement in her dizziness and she has not needed to use her wheelchair since last visit with this provider. She still experiences a feeling of being off balance while sitting. She reports the off balance feeling is worsened with ambulation. Patient will have initial appointment with ENT on 1/19/2022. Patient reports her nausea has resolved. Patient reports her neck pain has significantly improved. Assessment/Plan:    Cluster headache. Prescribed Verapamil 120 mg tablet twice daily and Prednisone 20 mg tablet, take 3 tablets daily for 5 days, then take 2.5 tablets for 1 day, then take 2 tablets daily for 1 day, then take 1.5 tablets daily for 1 day, then take 1 tablet daily for 1 day, then take 1/2 tablet daily for 1 day for management of cluster headaches. Dizziness. Patient referred to neurology for evaluation and treatment of dizziness. And patient will have initial appointment with ENT on January 19, 2023. Vitamin D deficiency. Continue Ergocalciferol 50,000 units q. 7 days for management of vitamin D deficiency. I have discussed the diagnosis with the patient and the intended plan as seen in the above orders. The patient has received an after-visit summary and questions were answered concerning future plans. I have discussed medication side effects and warnings with the patient as well. I have reviewed the plan of care with the patient, accepted their input and they are in agreement with the treatment goals.        Anuja Cherry NP  December 27, 2022

## 2023-02-06 ENCOUNTER — HOSPITAL ENCOUNTER (OUTPATIENT)
Dept: PHYSICAL THERAPY | Age: 48
End: 2023-02-06
Payer: COMMERCIAL

## 2023-02-06 PROCEDURE — 97112 NEUROMUSCULAR REEDUCATION: CPT

## 2023-02-06 PROCEDURE — 97162 PT EVAL MOD COMPLEX 30 MIN: CPT

## 2023-02-06 NOTE — THERAPY EVALUATION
[address] [facility] Remberto Reyes, [address] [facility], [address] [place] [address]  HUSSAIN Ferrer 67 / [address] [facility] PHYSICAL THERAPY SERVICES  Patient Name: Tarah Jiménez : 1975   Medical   Diagnosis: Dizziness and giddiness [R42] Treatment Diagnosis: Vestibular hypofunction   Onset Date: 22     Referral Source: Scott Jett [facility]): 2023   Prior Hospitalization: See medical history Provider #: [id number]   Prior Level of Function: Independent   Comorbidities: headaches   Medications: Verified on Patient Summary List   The Plan of Care and following information is based on the information from the initial evaluation.   ==========================================================================================  Assessment / Functional Analysis:    Pt is a 50 y/o female who presents to physical therapy with sx of vertigo since 22. Pt reports sx began after she had been showering when the room started spinning and she passed out on her bed. Pt received in waiting area ambulating with no AD exhibiting a guarded gait pattern of decreased gait speed, decreased step length, decreased trunk sway, and decreased nathalie. Today pt presents with no vision deficits, decreased static/dynamic balance, and negative Washington-Hallpike tests suggesting vestibular hypofunction rather than BPPV.    Pt will benefit from skilled physical therapy to address the above deficits, improve VOR, and enable pt to participate in functional activities of choice.    ==========================================================================================  Eval Complexity: History: MEDIUM  Complexity : 1-2 comorbidities / personal factors will impact the outcome/ POC Exam:LOW Complexity : 1-2 Standardized tests and measures addressing body structure, function, activity limitation and / or participation in recreation  Presentation: LOW Complexity : Stable, uncomplicated  Clinical Decision Making:MEDIUM Complexity : FOTO score of 26-74Overall Complexity:MEDIUM    Problem List: impaired gait/ balance   Treatment Plan may include any combination of the following: Therapeutic exercise, Neuromuscular reeducation, Manual therapy, and Therapeutic activity  Patient / Family readiness to learn indicated by: asking questions, trying to perform skills, and interest  Persons(s) to be included in education: patient (P)  Barriers to Learning/Limitations: None      Patient self reported health status: fair  Rehabilitation Potential: good    Objective Measures: FOTO: 46    ROM:                   AROM                                   Cervical %         Flex 100         Ext 100         L          R          L Rotation  R Rotation 75  75               Short Term Goals: 3 weeks  Pt will report compliance and demonstrate ability to correctly perform HEP in 3 weeks. Pt will report < 2/10 vertigo sx when performing all functional activity in 3 weeks. Pt will safely complete SLS balance test on LLE for 20 seconds in order to improve static/dynamic balance to improve functional mobility and decrease fall risk. Long Term Goals: 6 weeks  Pt will report 0/10 vertigo sx when performing all functional activity in 6 weeks. Pt will safely ambulate for 500 feet independently with no sx of dizziness or LOB in order to increase BLE endurance, improve coordination, and reduce fall risk. Pt will increase gross l AROM to 100% to be able to perform leisure activities of choice in 6 weeks. Pt will safely complete SLS balance test on LLE for 30 seconds in order to improve static/dynamic balance to improve functional mobility and decrease fall risk.         Frequency / Duration: Patient to be seen  2  times per week for 6  weeks:  Patient / Caregiver education and instruction: self care, activity modification, and exercises    Therapist Signature: Ximena Will PT, DPT Date: 2/3/3178   Certification Period: 2/6/23 - 5/6/23 Time: 3:41 PM   ===========================================================================================  I certify that the above Physical Therapy Services are being furnished while the patient is under my care. I agree with the treatment plan and certify that this therapy is necessary. Physician Signature:        Date:       Time:     Please sign and return to [facility] [place] [facility] or you may fax the signed copy to [phone]. Please call [phone] if more information required. Thank you.

## 2023-02-06 NOTE — THERAPY EVALUATION
PT NEURO EVAL/DAILY TREATMENT NOTE 10-18    Patient Name: Sree Vanegas  Date:2023  : 1975  [x]  Patient  Verified  Payor: BLUE CROSS / Plan: [address] [place] [address] / Product Type: PPO /    In time: 1400  Out time: 1445  Total Treatment Time (min): 45  Visit #: 1     Medicare/BCBS Only   Total Timed Codes (min):  15 1:1 Treatment Time:  45     Treatment Area: Dizziness and giddiness [R42]    SUBJECTIVE  Pt is a 48 y/o female who presents to physical therapy with sx of vertigo since 22. Pt reports sx began after she had been showering when the room started spinning and she passed out on her bed. Pt received in waiting area ambulating with no AD exhibiting a guarded gait pattern of decreased gait speed, decreased step length, decreased trunk sway, and decreased nathalie. Today pt presents with no vision deficits, decreased static/dynamic balance, and negative Shireen-Hallpike tests suggesting vestibular hypofunction rather than BPPV. Pt will benefit from skilled physical therapy to address the above deficits, improve VOR, and enable pt to participate in functional activities of choice.     Pain Level (0-10 scale): Current: \"feels wobbly\"  []Sharp  []Dull  []Achy []Burning []Throbbing []N&T []Other:  []constant []intermittent []improving []worsening []no change since onset    Any medication changes, allergies to medications, adverse drug reactions, diagnosis change, or new procedure performed?: [x] No    [] Yes (see summary sheet for update)      PLOF: Independent; no sx  Current function/ Deficits to PLOF: decreased balance; fear of falling  Current symptoms/Complaints: decreased balance  Previous Treatment/Compliance: na  Patient Goals/ Caregiver goals: Reduce sx, restore mobility  Barriers: []pain []financial []time []transportation []other  Motivation: Good  Substance use: []Alcohol []Tobacco []other:   Cognition: A & O x 4    Other:  Barriers to learning: []Learning disability []Unable to read []Sensory impairment []Other:      OBJECTIVE/EXAMINATION- Neurologic  Posture: [] Poor    [] Fair    [x] Good    Describe:       ROM:                 AROM     Cervical %      Flex 100      Ext 100      L       R       L Rotation  R Rotation 75  75          Tone: WNL    Motor Control: WNL    Sensation: WNL    Balance/ Equilibrium:              Left            Right  Tracks Across Midline      Reaches Across Midline           Sitting Balance: Static:  [x] Good    [] Fair    [] Poor     Dynamic:   [x] Good    [] Fair    [] Poor        Standing Balance: Static:   [x] Good    [] Fair    [] Poor     Dynamic:   [x] Good    [] Fair    [] Poor        Single Leg Stance:         Eyes Open  Eyes Closed   L :10 L Not performed   R :30 R Not performed     Optional Tests:       Dynamic Gait Index (24pt scale): 23/24       mCTSIB: 30 seconds in position 4 (NBOS on Airex, EC)      Behavior: [x] Cooperative    [] Impulsive    [] Agitated    [] Perseverative    [] Confused   Oriented x:    Cognition: [] One Step Commands   [x] Multiple Commands   [] Displays Neglect [] R  [] L    Other:       Impaired Judgement: [] Y    [x] N      Impaired Vision:  [] Y    [x] N      Safety Awareness Deficits  [] Y    [x] N      Impaired Hearing  [] Y    [x] N      Able to Express Needs [x] Y    [] N        Gait: [] Normal    [x] Abnormal    Device:      Describe: guarded gait pattern of decreased gait speed, decreased step length, decreased trunk sway, and decreased nathalie    Other test /comments:    Mobility: Independent  Self Care:  Independent        Modality rationale:    Min Type Additional Details    [] Estim:  []Unatt       []IFC  []Premod                        []Other:  []w/ice   []w/heat  Position:  Location:    [] Estim: []Att    []TENS instruct  []NMES                    []Other:  []w/US   []w/ice   []w/heat  Position:  Location:    []  Traction: [] Cervical       []Lumbar                       [] Prone []Supine                       []Intermittent   []Continuous Lbs:  [] before manual  [] after manual    []  Ultrasound: []Continuous   [] Pulsed                           []1MHz   []3MHz Location:  W/cm2:    []  Iontophoresis with dexamethasone         Location: [] Take home patch   [] In clinic    []  Ice     []  heat  []  Ice massage  []  Laser   []  Anodyne Position:  Location:    []  Laser with stim  []  Other: Position:  Location:    []  Vasopneumatic Device Pressure:       [] lo [] med [] hi   Temperature: [] lo [] med [] hi   [] Skin assessment post-treatment:  []intact []redness- no adverse reaction    []redness - adverse reaction:     30 min [x]Eval                  []Re-Eval        15 min Neuromuscular Re-education:  [x]  See flow sheet :   Rationale: improve coordination, improve balance, and increase proprioception  to decrease fall risk.             With   [] TE   [] TA   [] neuro   [] other: Patient Education: [x] Review HEP    [] Progressed/Changed HEP based on:   [] positioning   [] body mechanics   [] transfers   [] heat/ice application    [] other:        Sx Level (0-10 scale) post treatment: 0/10      ASSESSMENT:      [x]  See Plan of Care  []  See progress note/recertification  []  See Discharge Summary          Sanjuana Phalen, PT, DPT  2/6/2023  2:08 PM

## 2023-02-08 ENCOUNTER — APPOINTMENT (OUTPATIENT)
Dept: PHYSICAL THERAPY | Age: 48
End: 2023-02-08
Payer: COMMERCIAL

## 2023-02-14 ENCOUNTER — HOSPITAL ENCOUNTER (OUTPATIENT)
Age: 48
Setting detail: RECURRING SERIES
Discharge: HOME | End: 2023-02-17
Payer: COMMERCIAL

## 2023-02-14 PROCEDURE — 97110 THERAPEUTIC EXERCISES: CPT

## 2023-02-15 NOTE — PROGRESS NOTES
PT DAILY TREATMENT NOTE 8    Patient Name: Samir Chávez  Date:2/15/2023  : 1975  [x]  Patient  Verified  Payor: Jose Miguel Blackman / Plan: Daksha Keller / Product Type: *No Product type* /    In time:1530  Out time:1414  Total Treatment Time (min): 44  Total Timed Codes (min): 44  1:1 Treatment Time (min): 44   Visit #: 2     Treatment Area: Dizziness and giddiness [R42]    SUBJECTIVE  Pt reported that she feels like she is always going to fall forward, and that she leans forward. Pain Level (0-10 scale): 0    Any medication changes, allergies to medications, adverse drug reactions, diagnosis change, or new procedure performed?: [x] No    [] Yes (see summary sheet for update)        OBJECTIVE  Modality rationale: NA   Min Type Additional Details    [] Estim: []Att   []Unatt  []TENS instruct                 []IFC  []Premod []NMES                       []Other:  []w/US   []w/ice   []w/heat  Position:  Location:    []  Traction: [] Cervical       []Lumbar                       [] Prone          []Supine                       []Intermittent   []Continuous Lbs:  [] before manual  [] after manual    []  Ultrasound: []Continuous   [] Pulsed                           []1MHz   []3MHz Location:  W/cm2:    []  Iontophoresis with dexamethasone         Location: [] Take home patch   [] In clinic    []  Ice     []  heat  []  Ice massage Position:  Location:    []  Vasopneumatic Device Pressure: [] lo [] med [] hi   Temp: [] lo [] med [] hi   [] Skin assessment post-treatment:  []intact []redness- no adverse reaction       []redness - adverse reaction:           44 min Therapeutic Exercise:  [x] See flow sheet :   Rationale: improve coordination and improve balance to improve the patients ability to return to prior level of function before injury/illness with reduced pain, achieving optimal strength and function to perform household tasks, daily activities, and return to community events, and/or work. min Therapeutic Activity:  []  See flow sheet :   Rationale:       min Neuromuscular Re-education:  []  See flow sheet :   Rationale:      min Manual Therapy:     Rationale:      min Gait Training:  ___ feet with ___ device on level surfaces with ___ level of assist   Rationale: With TE  TA   NR  GT   Misc Patient Education: [x] Review HEP    [] Progressed/Changed HEP based on:   [] positioning   [] body mechanics   [] transfers   [] heat/ice application          Pain Level (0-10 scale) post treatment: 0    ASSESSMENT/Changes in Function:  Began session with warm up on stepper with head turns, followed by seated UT/Levator stretches, SLS, balance board, ball on wall, letter on wall. Pt able to tolerate but still challenged. Has MRI w contrast pending for next week. Will cont to progress as able. Cont POC. Patient will continue to benefit from skilled PT services to modify and progress therapeutic interventions, analyze and address functional mobility deficits, analyze and address strength deficits, and analyze and address soft tissue restrictions to attain remaining goals.        [x]  See Plan of Care  []  See progress note/recertification  []  See Discharge Summary           PLAN  [x]  Upgrade activities as tolerated     [x]  Continue plan of care  []  Update interventions per flow sheet       []  Discharge due to:_  []  Other:_      Cuate Lugo PTA, LPTA 2/15/2023  1:45  PM

## 2023-02-16 ENCOUNTER — HOSPITAL ENCOUNTER (OUTPATIENT)
Age: 48
Setting detail: RECURRING SERIES
Discharge: HOME | End: 2023-02-19
Payer: COMMERCIAL

## 2023-02-16 PROCEDURE — 97110 THERAPEUTIC EXERCISES: CPT

## 2023-02-16 NOTE — PROGRESS NOTES
PT DAILY TREATMENT NOTE 8    Patient Name: Ky Babin  Date:2023  : 1975  [x]  Patient  Verified  Payor: Trevor Osler / Plan: Brady Wells / Product Type: *No Product type* /    In time:  Out time:161  Total Treatment Time (min): 45  Total Timed Codes (min): 45  1:1 Treatment Time (min): 45   Visit #: 3    Treatment Area: Dizziness and giddiness [R42]    SUBJECTIVE  Pt reported that she feels like she is always going to fall forward, and that she leans forward. Pain Level (0-10 scale): 0    Any medication changes, allergies to medications, adverse drug reactions, diagnosis change, or new procedure performed?: [x] No    [] Yes (see summary sheet for update)        OBJECTIVE  Modality rationale: NA   Min Type Additional Details    [] Estim: []Att   []Unatt  []TENS instruct                 []IFC  []Premod []NMES                       []Other:  []w/US   []w/ice   []w/heat  Position:  Location:    []  Traction: [] Cervical       []Lumbar                       [] Prone          []Supine                       []Intermittent   []Continuous Lbs:  [] before manual  [] after manual    []  Ultrasound: []Continuous   [] Pulsed                           []1MHz   []3MHz Location:  W/cm2:    []  Iontophoresis with dexamethasone         Location: [] Take home patch   [] In clinic    []  Ice     []  heat  []  Ice massage Position:  Location:    []  Vasopneumatic Device Pressure: [] lo [] med [] hi   Temp: [] lo [] med [] hi   [] Skin assessment post-treatment:  []intact []redness- no adverse reaction       []redness - adverse reaction:           45 min Therapeutic Exercise:  [x] See flow sheet :   Rationale: improve coordination and improve balance to improve the patients ability to return to prior level of function before injury/illness with reduced pain, achieving optimal strength and function to perform household tasks, daily activities, and return to community events, and/or work. min Therapeutic Activity:  []  See flow sheet :   Rationale:       min Neuromuscular Re-education:  []  See flow sheet :   Rationale:      min Manual Therapy:     Rationale:      min Gait Training:  ___ feet with ___ device on level surfaces with ___ level of assist   Rationale: With TE  TA   NR  GT   Misc Patient Education: [x] Review HEP    [] Progressed/Changed HEP based on:   [] positioning   [] body mechanics   [] transfers   [] heat/ice application          Pain Level (0-10 scale) post treatment: 0    ASSESSMENT/Changes in Function:  Began session with warm up on stepper with head turns, followed by seated UT/Levator stretches,  balance board, letter on wal with side to side and up down . Added airex pad to improve balance with EC today and no US support, tandem walk with up and down and side to side head turnd in bars. Pt able to tolerate but still challenged. Has MRI w contrast pending for next week. Will cont to progress as able. Cont POC. Patient will continue to benefit from skilled PT services to modify and progress therapeutic interventions, analyze and address functional mobility deficits, analyze and address strength deficits, and analyze and address soft tissue restrictions to attain remaining goals.        [x]  See Plan of Care  []  See progress note/recertification  []  See Discharge Summary           PLAN  [x]  Upgrade activities as tolerated     [x]  Continue plan of care  []  Update interventions per flow sheet       []  Discharge due to:_  []  Other:_      Orvil Bowels, PTA , LPTA 2/16/2023  5:20 PM

## 2023-02-21 ENCOUNTER — HOSPITAL ENCOUNTER (OUTPATIENT)
Age: 48
Setting detail: RECURRING SERIES
Discharge: HOME | End: 2023-02-24
Payer: COMMERCIAL

## 2023-02-21 PROCEDURE — 97110 THERAPEUTIC EXERCISES: CPT

## 2023-02-21 NOTE — PROGRESS NOTES
PT DAILY TREATMENT NOTE 8    Patient Name: Felicitas Castillo  Date:2023  : 1975  [x]  Patient  Verified  Payor: Taj Zavaleta 150 / Plan: Gopi Young / Product Type: *No Product type* /    In time:  Out time:   Total Treatment Time (min): 45  Total Timed Codes (min): 45  1:1 Treatment Time (min): 45   Visit #: 4    Treatment Area: Dizziness and giddiness [R42]    SUBJECTIVE  Pt reported that she feels like she is always going to fall forward, and that she leans forward.      Pain Level (0-10 scale): 0 pain        5 on dizzy    Any medication changes, allergies to medications, adverse drug reactions, diagnosis change, or new procedure performed?: [x] No    [] Yes (see summary sheet for update)        OBJECTIVE  Modality rationale: NA   Min Type Additional Details    [] Estim: []Att   []Unatt  []TENS instruct                 []IFC  []Premod []NMES                       []Other:  []w/US   []w/ice   []w/heat  Position:  Location:    []  Traction: [] Cervical       []Lumbar                       [] Prone          []Supine                       []Intermittent   []Continuous Lbs:  [] before manual  [] after manual    []  Ultrasound: []Continuous   [] Pulsed                           []1MHz   []3MHz Location:  W/cm2:    []  Iontophoresis with dexamethasone         Location: [] Take home patch   [] In clinic    []  Ice     []  heat  []  Ice massage Position:  Location:    []  Vasopneumatic Device Pressure: [] lo [] med [] hi   Temp: [] lo [] med [] hi   [] Skin assessment post-treatment:  []intact []redness- no adverse reaction       []redness - adverse reaction:           45 min Therapeutic Exercise:  [x] See flow sheet :   Rationale: improve coordination and improve balance to improve the patients ability to return to prior level of function before injury/illness with reduced pain, achieving optimal strength and function to perform household tasks, daily activities, and return to community events, and/or work. min Therapeutic Activity:  []  See flow sheet :   Rationale:       min Neuromuscular Re-education:  []  See flow sheet :   Rationale:      min Manual Therapy:     Rationale:      min Gait Training:  ___ feet with ___ device on level surfaces with ___ level of assist   Rationale: With TE  TA   NR  GT   Misc Patient Education: [x] Review HEP    [] Progressed/Changed HEP based on:   [] positioning   [] body mechanics   [] transfers   [] heat/ice application          Pain Level (0-10 scale) post treatment: 0    ASSESSMENT/Changes in Function:  Began session with warm up on stationary bike with head turns, followed by seated UT/Levator stretches,  balance board, ball on wall with side to side and up down motion. Continued with airex pad with pt NBOS to improve balance with EC today and no UE support, tandem walk with up and down and side to side head turnd in bars. Added timed corner marching x 30 seconds for proprioception awareness. Pt moved from corner to 3 feet away from wall. Mot challenging this date was tandem walking with LOB on several occassions with pt reporting that \"it felt like someone was pushing me from behind. \"  Pt able to tolerate but still challenged. Has MRI w contrast pending for Thursday 2/23/2023. Will cont to progress as able. Cont POC. Patient will continue to benefit from skilled PT services to modify and progress therapeutic interventions, analyze and address functional mobility deficits, analyze and address strength deficits, and analyze and address soft tissue restrictions to attain remaining goals.        [x]  See Plan of Care  []  See progress note/recertification  []  See Discharge Summary           PLAN  [x]  Upgrade activities as tolerated     [x]  Continue plan of care  []  Update interventions per flow sheet       []  Discharge due to:_  []  Other:_      Marisol Philippe, PTA   2/21/2023  6:27 PM

## 2023-02-23 ENCOUNTER — APPOINTMENT (OUTPATIENT)
Age: 48
End: 2023-02-23
Payer: COMMERCIAL

## 2023-02-28 ENCOUNTER — HOSPITAL ENCOUNTER (OUTPATIENT)
Age: 48
Setting detail: RECURRING SERIES
Discharge: HOME | End: 2023-03-03
Payer: COMMERCIAL

## 2023-02-28 PROCEDURE — 97110 THERAPEUTIC EXERCISES: CPT

## 2023-02-28 NOTE — PROGRESS NOTES
1729PT DAILY TREATMENT NOTE 8-    Patient Name: Anuja Shows  Date:2023  : 1975  [x]  Patient  Verified  Payor: Disha Juma / Plan: Angel Pat / Product Type: *No Product type* /    In time:  Out time:  Total Treatment Time (min): 52  Total Timed Codes (min): 52  1:1 Treatment Time (min): 52   Visit #: 5    Treatment Area: Dizziness and giddiness [R42]    SUBJECTIVE  Pt reported that she feels like she is always going to fall forward, and that she leans forward. Pain Level (0-10 scale): 0    Any medication changes, allergies to medications, adverse drug reactions, diagnosis change, or new procedure performed?: [x] No    [] Yes (see summary sheet for update)        OBJECTIVE  Modality rationale: NA   Min Type Additional Details    [] Estim: []Att   []Unatt  []TENS instruct                 []IFC  []Premod []NMES                       []Other:  []w/US   []w/ice   []w/heat  Position:  Location:    []  Traction: [] Cervical       []Lumbar                       [] Prone          []Supine                       []Intermittent   []Continuous Lbs:  [] before manual  [] after manual    []  Ultrasound: []Continuous   [] Pulsed                           []1MHz   []3MHz Location:  W/cm2:    []  Iontophoresis with dexamethasone         Location: [] Take home patch   [] In clinic    []  Ice     []  heat  []  Ice massage Position:  Location:    []  Vasopneumatic Device Pressure: [] lo [] med [] hi   Temp: [] lo [] med [] hi   [] Skin assessment post-treatment:  []intact []redness- no adverse reaction       []redness - adverse reaction:           52 min Therapeutic Exercise:  [x] See flow sheet :   Rationale: improve coordination and improve balance to improve the patients ability to return to prior level of function before injury/illness with reduced pain, achieving optimal strength and function to perform household tasks, daily activities, and return to community events, and/or work. min Therapeutic Activity:  []  See flow sheet :   Rationale:       min Neuromuscular Re-education:  []  See flow sheet :   Rationale:      min Manual Therapy:     Rationale:      min Gait Training:  ___ feet with ___ device on level surfaces with ___ level of assist   Rationale: With TE  TA   NR  GT   Misc Patient Education: [x] Review HEP    [] Progressed/Changed HEP based on:   [] positioning   [] body mechanics   [] transfers   [] heat/ice application          Pain Level (0-10 scale) post treatment: 0    ASSESSMENT/Changes in Function:  Began session with warm up on stepper with head turns, followed by seated UT/Levator stretches,  balance board, letter on wal with side to side and up down . Added airex pad to improve balance with EC today and no US support, tandem walk with up and down and side to side head turnd in bars. Pt able to tolerate but still challenged. Pt advised that she was unable to get MRI secondary to a panic attack at the appointment. Awaiting call from neurologist to possibly get medication to assist with anxiousness and reschedule. Will cont to progress as able. Cont POC. Patient will continue to benefit from skilled PT services to modify and progress therapeutic interventions, analyze and address functional mobility deficits, analyze and address strength deficits, and analyze and address soft tissue restrictions to attain remaining goals.        [x]  See Plan of Care  []  See progress note/recertification  []  See Discharge Summary           PLAN  [x]  Upgrade activities as tolerated     [x]  Continue plan of care  []  Update interventions per flow sheet       []  Discharge due to:_  []  Other:_      Allyn Thomas PTA, LPTA 2/28/2023  5:20 PM

## 2023-03-02 ENCOUNTER — HOSPITAL ENCOUNTER (OUTPATIENT)
Age: 48
Setting detail: RECURRING SERIES
Discharge: HOME | End: 2023-03-05
Payer: COMMERCIAL

## 2023-03-02 PROCEDURE — 97110 THERAPEUTIC EXERCISES: CPT

## 2023-03-02 NOTE — PROGRESS NOTES
PT DAILY TREATMENT NOTE 8    Patient Name: Nuha Ceja  Date:3/2/2023  : 1975  [x]  Patient  Verified  Payor: Tiff Dickey / Plan: Leann Kay / Product Type: *No Product type* /    In time:  Out time:181  Total Treatment Time (min): 50  Total Timed Codes (min): 50  1:1 Treatment Time (min): 50   Visit #: 6     Treatment Area: Dizziness and giddiness [R42]    SUBJECTIVE  Pt states, \"I've feel like PT has helped a lot. I am now able to stand up and walk on my own. \" She continues to reports feelings of falling forward all day. Pain Level (0-10 scale): 0    Any medication changes, allergies to medications, adverse drug reactions, diagnosis change, or new procedure performed?: [x] No    [] Yes (see summary sheet for update)    OBJECTIVE    50 min Therapeutic Exercise:  [] See flow sheet :   Rationale: increase ROM, increase strength, improve coordination, improve balance, and increase proprioception to improve the patients ability to increase equilibrium allowing for safe ambulation and transfers. With TE  TA   NR  GT   [facility] Education: [x] Review HEP    [] Progressed/Changed HEP based on:   [] positioning   [] body mechanics   [] transfers   [] heat/ice application        Pain Level (0-10 scale) post treatment: 0    ASSESSMENT/Changes in Function: Contineud with POC working to achieve short term goals to will report compliance and demonstrate ability to correctly perform HEP in 3 weeks, to report < 2/10 vertigo sx when performing all functional activity in 3 weeks, and to safely complete SLS balance test on LLE for 20 seconds in order to improve static/dynamic balance to improve functional mobility and decrease fall risk. Completed exercises per flowsheet correct eye placement, MACHELLE, and mind/body connection. Pt completed multiple laps around the gym with varying degrees on head turns and EO/EC. Pt lost balance reporting a anterior lean throughout today's session.  Pt described it as a heavy ball rolling her forward. Educated patient of rehab theory and discussed HEP. Plan to continue POC to patient's tolerance next visit. Patient will continue to benefit from skilled PT services to modify and progress therapeutic interventions, analyze and address functional mobility deficits, analyze and address ROM deficits, analyze and address strength deficits, analyze and address soft tissue restrictions, analyze and cue for proper movement patterns, analyze and modify for postural abnormalities, and analyze and address imbalance/dizziness to attain remaining goals.      [x]  See Plan of Care  []  See progress note/recertification  []  See Discharge Summary       PLAN  []  Upgrade activities as tolerated     [x]  Continue plan of care  []  Update interventions per flow sheet       []  Discharge due to:_  []  Other:_      KEN Longoria  3/2/2023  5:36 PM

## 2023-03-07 ENCOUNTER — APPOINTMENT (OUTPATIENT)
Age: 48
End: 2023-03-07
Payer: COMMERCIAL

## 2023-03-09 ENCOUNTER — HOSPITAL ENCOUNTER (OUTPATIENT)
Age: 48
Setting detail: RECURRING SERIES
Discharge: HOME | End: 2023-03-12
Payer: COMMERCIAL

## 2023-03-09 PROCEDURE — 97110 THERAPEUTIC EXERCISES: CPT

## 2023-03-09 NOTE — PROGRESS NOTES
PT DAILY TREATMENT NOTE 8-    Patient Name: Briana Bishop  Date:3/9/2023  : 1975  [x]  Patient  Verified  Payor: Osmany Ribeiro / Plan: Laura Chung / Product Type: *No Product type* /    In time:1730  Out time:183  Total Treatment Time (min): 62  Total Timed Codes (min): 62  1:1 Treatment Time (min): 62   Visit #: 7    Treatment Area: Dizziness and giddiness [R42]    SUBJECTIVE  Pt reports times when she feels like she's falling forward and times when she does not. Pt is hesitant to have MRI completed due to claustrophobia. Pain Level (0-10 scale): 0    Any medication changes, allergies to medications, adverse drug reactions, diagnosis change, or new procedure performed?: [x] No    [] Yes (see summary sheet for update)    OBJECTIVE    60 min Therapeutic Exercise:  [] See flow sheet :   Rationale: increase ROM, increase strength, improve coordination, improve balance, and increase proprioception to improve the patients ability to increase equilibrium allowing for safe ambulation and transfers. With TE  TA   NR  GT   Misc Patient Education: [x] Review HEP    [] Progressed/Changed HEP based on:   [] positioning   [] body mechanics   [] transfers   [] heat/ice application        Pain Level (0-10 scale) post treatment: 0    ASSESSMENT/Changes in Function: Completed exercises per flowsheet with verbal cues given to improve eye placement, MACHELLE, and mind/body connection. Pt completed multiple laps around the gym with varying degrees on head turns and EO/EC showing less path deviation compared to previous session. Continued with balance training on level and unlevel surfaces. Plan to continue POC to patient's tolerance next visit.      Patient will continue to benefit from skilled PT services to modify and progress therapeutic interventions, analyze and address functional mobility deficits, analyze and address ROM deficits, analyze and address strength deficits, analyze and address soft tissue restrictions, analyze and cue for proper movement patterns, analyze and modify for postural abnormalities, and analyze and address imbalance/dizziness to attain remaining goals.      [x]  See Plan of Care  []  See progress note/recertification  []  See Discharge Summary       PLAN  []  Upgrade activities as tolerated     [x]  Continue plan of care  []  Update interventions per flow sheet       []  Discharge due to:_  []  Other:_      KEN Longoria  3/9/2023  6:34 PM

## 2023-03-13 ENCOUNTER — HOSPITAL ENCOUNTER (OUTPATIENT)
Age: 48
Setting detail: RECURRING SERIES
Discharge: HOME | End: 2023-03-16
Payer: COMMERCIAL

## 2023-03-13 PROCEDURE — 97110 THERAPEUTIC EXERCISES: CPT

## 2023-03-13 NOTE — PROGRESS NOTES
Physical Therapy      PT DAILY TREATMENT NOTE     Patient Name: Heath Cee  Date:3/13/2023  : 1975  [x]  Patient  Verified  Payor: Brooklyn Erp / Plan: Jordyn Standing / Product Type: *No Product type* /    In time:1735  Out time:1815  Total Treatment Time (min): 50  Total Timed Codes (min): 50  1:1 Treatment Time (min): 50  Visit #: 8    Treatment Area: Dizziness and giddiness [R42]    SUBJECTIVE  Pt reports times when she feels like she's falling forward and times when she does not. No other concerns at this time. She denies falls. Pain Level (0-10 scale): 0    Any medication changes, allergies to medications, adverse drug reactions, diagnosis change, or new procedure performed?: [x] No    [] Yes (see summary sheet for update)    OBJECTIVE    50 min Therapeutic Exercise:  [] See flow sheet :   Rationale: increase ROM, increase strength, improve coordination, improve balance, and increase proprioception to improve the patients ability to increase equilibrium allowing for safe ambulation and transfers. With TE  TA   NR  GT   Misc Patient Education: [x] Review HEP    [] Progressed/Changed HEP based on:   [] positioning   [] body mechanics   [] transfers   [] heat/ice application        Pain Level (0-10 scale) post treatment: 0    ASSESSMENT/Changes in Function: Session began on stepper with head turns. Followed by seated cervical stretching. Continued with balance training on level and unlevel surfaces with eyes open and eyes closed as documented on flow sheet. Walking with head turns side to side an up and down pt has LOB with self recovery but denies dizziness. Plan to continue POC to patient's tolerance next visit.      Patient will continue to benefit from skilled PT services to modify and progress therapeutic interventions, analyze and address functional mobility deficits, analyze and address ROM deficits, analyze and address strength deficits, analyze and address soft tissue restrictions, analyze and cue for proper movement patterns, analyze and modify for postural abnormalities, and analyze and address imbalance/dizziness to attain remaining goals.      [x]  See Plan of Care  []  See progress note/recertification  []  See Discharge Summary       PLAN  []  Upgrade activities as tolerated     [x]  Continue plan of care  []  Update interventions per flow sheet       []  Discharge due to:_  []  Other:_      KEN Camarena  3/13/2023  6:30 PM

## 2023-03-14 ENCOUNTER — HOSPITAL ENCOUNTER (OUTPATIENT)
Age: 48
Setting detail: RECURRING SERIES
Discharge: HOME | End: 2023-03-17
Payer: COMMERCIAL

## 2023-03-14 PROCEDURE — 97110 THERAPEUTIC EXERCISES: CPT

## 2023-03-14 NOTE — PROGRESS NOTES
Physical Therapy      PT DAILY TREATMENT NOTE     Patient Name: Monroe Ac  Date:3/14/2023  : 1975  [x]  Patient  Verified  Payor: Becky Pearl / Plan: Donna Lines / Product Type: *No Product type* /    In time:  Out time:  Total Treatment Time (min): 58  Total Timed Codes (min): 58  1:1 Treatment Time (min): 58  Visit #: 9    Treatment Area: Dizziness and giddiness [R42]    SUBJECTIVE  Pt reports since last visit she had one episode of being dizzy as she felt it was brought on by standing too quickly. She denies falls. Pain Level (0-10 scale): 0    Any medication changes, allergies to medications, adverse drug reactions, diagnosis change, or new procedure performed?: [x] No    [] Yes (see summary sheet for update)    OBJECTIVE    58 min Therapeutic Exercise:  [x] See flow sheet :   Rationale: increase ROM, increase strength, improve coordination, improve balance, and increase proprioception to improve the patients ability to increase equilibrium allowing for safe ambulation and transfers. With TE  TA   NR  GT   Misc Patient Education: [x] Review HEP    [] Progressed/Changed HEP based on:   [] positioning   [] body mechanics   [] transfers   [] heat/ice application        Pain Level (0-10 scale) post treatment: 0    ASSESSMENT/Changes in Function: Session began on stepper with head turns. Followed by seated cervical stretching. Continued with balance training on level and unlevel surfaces with eyes open and eyes closed as documented on flow sheet. Walking with head turns side to side an up and down pt has LOB with self recovery but denies dizziness. Corner marching with eyes closed has pt turning to her right and walking forward 4-6 feet. Plan to continue POC to patient's tolerance next visit.      Patient will continue to benefit from skilled PT services to modify and progress therapeutic interventions, analyze and address functional mobility deficits, analyze and address ROM deficits, analyze and address strength deficits, analyze and address soft tissue restrictions, analyze and cue for proper movement patterns, analyze and modify for postural abnormalities, and analyze and address imbalance/dizziness to attain remaining goals.      [x]  See Plan of Care  []  See progress note/recertification  []  See Discharge Summary       PLAN  []  Upgrade activities as tolerated     [x]  Continue plan of care  []  Update interventions per flow sheet       []  Discharge due to:_  []  Other:_      KEN Russell  3/14/2023  6:55 PM

## 2023-03-20 ENCOUNTER — HOSPITAL ENCOUNTER (OUTPATIENT)
Age: 48
Setting detail: RECURRING SERIES
Discharge: HOME | End: 2023-03-23
Payer: COMMERCIAL

## 2023-03-20 PROCEDURE — 97110 THERAPEUTIC EXERCISES: CPT

## 2023-03-20 NOTE — PROGRESS NOTES
looking at targets (placed high and low) in side to side and up and down motions. Slight dizziness noted with this task in all directions. Ambulate on level surface with head turns up/down and side to side in larger area room with slight dizziness reported. No LOB. Ambulated 600  feet at her pace with challenge of lights on/off during her walking. Slight dizziness reported by patient but no LOB. Plan to continue POC to patient's tolerance next visit. PTA asked pt to incorporate vacuuming at home, driving in a safe area with some one with her and to carry a bag of groceries from the car into the hose and share how she felt (dizziness) on here reassessment day. She will have reassessment next week. Patient will continue to benefit from skilled PT services to modify and progress therapeutic interventions, analyze and address functional mobility deficits, analyze and address ROM deficits, analyze and address strength deficits, analyze and address soft tissue restrictions, analyze and cue for proper movement patterns, analyze and modify for postural abnormalities, and analyze and address imbalance/dizziness to attain remaining goals.      [x]  See Plan of Care  []  See progress note/recertification  []  See Discharge Summary       PLAN  []  Upgrade activities as tolerated     [x]  Continue plan of care  []  Update interventions per flow sheet       []  Discharge due to:_  []  Other:_      KEN Skelton   3/20/2023 6:46 PM

## 2023-03-21 ENCOUNTER — HOSPITAL ENCOUNTER (OUTPATIENT)
Age: 48
Setting detail: RECURRING SERIES
Discharge: HOME | End: 2023-03-24
Payer: COMMERCIAL

## 2023-03-21 PROCEDURE — 97110 THERAPEUTIC EXERCISES: CPT

## 2023-03-22 RX ORDER — TOPIRAMATE 50 MG/1
TABLET, FILM COATED ORAL
COMMUNITY
Start: 2023-01-19

## 2023-03-22 RX ORDER — VERAPAMIL HYDROCHLORIDE 120 MG/1
120 TABLET, FILM COATED ORAL 2 TIMES DAILY
COMMUNITY
Start: 2022-12-27

## 2023-03-22 RX ORDER — DIAZEPAM 5 MG/1
5 TABLET ORAL
COMMUNITY
Start: 2022-11-29

## 2023-03-22 RX ORDER — ERGOCALCIFEROL 1.25 MG/1
CAPSULE ORAL
COMMUNITY
Start: 2022-12-27

## 2023-03-27 ENCOUNTER — OFFICE VISIT (OUTPATIENT)
Facility: CLINIC | Age: 48
End: 2023-03-27
Payer: COMMERCIAL

## 2023-03-27 VITALS
DIASTOLIC BLOOD PRESSURE: 82 MMHG | BODY MASS INDEX: 32.85 KG/M2 | WEIGHT: 174 LBS | RESPIRATION RATE: 18 BRPM | OXYGEN SATURATION: 95 % | HEART RATE: 76 BPM | TEMPERATURE: 98.3 F | HEIGHT: 61 IN | SYSTOLIC BLOOD PRESSURE: 131 MMHG

## 2023-03-27 DIAGNOSIS — G44.011 EPISODIC CLUSTER HEADACHE, INTRACTABLE: Primary | ICD-10-CM

## 2023-03-27 DIAGNOSIS — R42 DIZZINESS AND GIDDINESS: ICD-10-CM

## 2023-03-27 PROCEDURE — 99213 OFFICE O/P EST LOW 20 MIN: CPT | Performed by: NURSE PRACTITIONER

## 2023-03-27 SDOH — ECONOMIC STABILITY: INCOME INSECURITY: HOW HARD IS IT FOR YOU TO PAY FOR THE VERY BASICS LIKE FOOD, HOUSING, MEDICAL CARE, AND HEATING?: SOMEWHAT HARD

## 2023-03-27 SDOH — ECONOMIC STABILITY: FOOD INSECURITY: WITHIN THE PAST 12 MONTHS, YOU WORRIED THAT YOUR FOOD WOULD RUN OUT BEFORE YOU GOT MONEY TO BUY MORE.: SOMETIMES TRUE

## 2023-03-27 SDOH — ECONOMIC STABILITY: HOUSING INSECURITY
IN THE LAST 12 MONTHS, WAS THERE A TIME WHEN YOU DID NOT HAVE A STEADY PLACE TO SLEEP OR SLEPT IN A SHELTER (INCLUDING NOW)?: NO

## 2023-03-27 SDOH — ECONOMIC STABILITY: FOOD INSECURITY: WITHIN THE PAST 12 MONTHS, THE FOOD YOU BOUGHT JUST DIDN'T LAST AND YOU DIDN'T HAVE MONEY TO GET MORE.: OFTEN TRUE

## 2023-03-27 ASSESSMENT — PATIENT HEALTH QUESTIONNAIRE - PHQ9
SUM OF ALL RESPONSES TO PHQ QUESTIONS 1-9: 2
SUM OF ALL RESPONSES TO PHQ QUESTIONS 1-9: 2
2. FEELING DOWN, DEPRESSED OR HOPELESS: 1
SUM OF ALL RESPONSES TO PHQ QUESTIONS 1-9: 2
SUM OF ALL RESPONSES TO PHQ QUESTIONS 1-9: 2
1. LITTLE INTEREST OR PLEASURE IN DOING THINGS: 1
SUM OF ALL RESPONSES TO PHQ9 QUESTIONS 1 & 2: 2

## 2023-03-27 NOTE — PROGRESS NOTES
Lionel Gosselin presents today for   Chief Complaint   Patient presents with    Establish Care     New to provider. Is someone accompanying this pt? Yes,      Is the patient using any DME equipment during OV? no    Health Maintenance reviewed and discussed and ordered per Provider. Health Maintenance Due   Topic Date Due    COVID-19 Vaccine (1) Never done    HIV screen  Never done    Hepatitis C screen  Never done    DTaP/Tdap/Td vaccine (1 - Tdap) Never done    Diabetes screen  Never done    Lipids  Never done    Colorectal Cancer Screen  Never done    Flu vaccine (1) Never done   . Coordination of Care:  1. \"Have you been to the ER, urgent care clinic since your last visit? Hospitalized since your last visit? \" No    2. \"Have you seen or consulted any other health care providers outside of the [address] [place] [address] since your last visit? \" No    3. For patients aged 39-70: Has the patient had a colonoscopy? Yes- Rooming LPN/MA will get records    If the patient is female:    4. For patients aged 41-77: Has the patient had a mammogram within the past 2 years? Yes- Rooming LPN/MA will get records    5. For patients aged 21-65: Has the patient had a pap smear?  Yes- No care gap present
131/82   Site: Left Upper Arm   Position: Sitting   Cuff Size: Large Adult   Pulse: 76   Resp: 18   Temp: 98.3 °F (36.8 °C)   TempSrc: Temporal   SpO2: 95%   Weight: 174 lb (78.9 kg)   Height: 5' 1\" (1.549 m)     Laboratory/Tests:  No visits with results within 3 Month(s) from this visit. Latest known visit with results is:   No results found for any previous visit. Patient is currently receiving physical therapy twice per week, which has improved her periods of imbalance. She reports her appointment with neurology, NICOLE Caisllas, resulted in MRI which she reports would require second MRI with contrast.  Patient reports she was not given very much information as to the results of her MRI nor as to why she would need second MRI with contrast.  She reports NICOLE Casillas seemed abrasive and dismissive during her consultation. I am unable to review patient's MRI results. Patient reports she is very claustrophobic and though MRI was open she fears repeat MRI. She does not wish to see NICOLE Neumann again. Will refer patient to neurology, Dr. Arnaud Villasenor at this visit. Patient reports she still feels sensation as if someone is pushing her from behind as well as some ringing in bilateral ears. She also states she has sensation that she is moving which is exacerbated when her eyes are closed. She reports she does not feel dizzy but more imbalanced. Assessment/Plan:    Dizziness and giddiness. Patient referred to neurology, Dr. Vane Basilio for evaluation and treatment. Vitamin D deficiency. Continue Ergocalciferol 50,000 units q. 7 days for management of vitamin D deficiency. I have discussed the diagnosis with the patient and the intended plan as seen in the above orders. The patient has received an after-visit summary and questions were answered concerning future plans. I have discussed medication side effects and warnings with the patient as well.  I have reviewed the plan of care with the patient, accepted

## 2023-03-30 ENCOUNTER — APPOINTMENT (OUTPATIENT)
Age: 48
End: 2023-03-30
Payer: COMMERCIAL

## 2023-04-04 ENCOUNTER — HOSPITAL ENCOUNTER (OUTPATIENT)
Age: 48
Setting detail: RECURRING SERIES
Discharge: HOME | End: 2023-04-07
Payer: COMMERCIAL

## 2023-04-04 PROCEDURE — 97110 THERAPEUTIC EXERCISES: CPT

## 2023-04-04 NOTE — PROGRESS NOTES
Physical Therapy      PT DAILY TREATMENT NOTE     Patient Name: Jennie Mathis  Date:2023  : 1975  [x]  Patient  Verified  Payor: Song Route / Plan: [facility] / Product Type: *No Product type* /    In time:  173  Out time:1838  Total Treatment Time (min): 63  Total Timed Codes (min): 63  1:1 Treatment Time (min): 63  Visit #: 12    Treatment Area: Dizziness and giddiness [R42]    SUBJECTIVE  Pt reports she has had no falls and just has a feeling of imbalance and denies dizziness. Pain Level (0-10 scale): 0    Any medication changes, allergies to medications, adverse drug reactions, diagnosis change, or new procedure performed?: [x] No    [] Yes (see summary sheet for update)    OBJECTIVE    63 min Therapeutic Exercise:  [x] See flow sheet :   Rationale: increase ROM, increase strength, improve coordination, improve balance, and increase proprioception to improve the patients ability to increase equilibrium allowing for safe ambulation and transfers. With TE  TA   NR  GT   Misc Patient Education: [x] Review HEP    [] Progressed/Changed HEP based on:   [] positioning   [] body mechanics   [] transfers   [] heat/ice application        Pain Level (0-10 scale) post treatment: 0    ASSESSMENT/Changes in Function: Session began on stepper with head turns. Followed by seated cervical stretching. Continued with balance training on level and unlevel surfaces with eyes open and eyes closed as documented on flow sheet. Walking with head turns side to side an up and down pt has slight dizziness. Eyes closed marching in place has pt turning to her right and walking forward 3-4 feet. Continued challenge of gym walking with head turns looking at targets (placed high and low) in side to side and up and down motions. Slight dizziness noted with this task in all directions.   Ambulate on level surface with head turns up/down and side to side in larger area room with slight dizziness

## 2023-04-11 ENCOUNTER — HOSPITAL ENCOUNTER (OUTPATIENT)
Age: 48
Setting detail: RECURRING SERIES
End: 2023-04-11
Payer: COMMERCIAL

## 2023-04-13 ENCOUNTER — HOSPITAL ENCOUNTER (OUTPATIENT)
Age: 48
Setting detail: RECURRING SERIES
Discharge: HOME | End: 2023-04-16
Payer: COMMERCIAL

## 2023-04-13 PROCEDURE — 97110 THERAPEUTIC EXERCISES: CPT

## 2023-04-20 ENCOUNTER — APPOINTMENT (OUTPATIENT)
Age: 48
End: 2023-04-20
Payer: COMMERCIAL

## 2023-04-24 ENCOUNTER — HOSPITAL ENCOUNTER (OUTPATIENT)
Age: 48
Setting detail: RECURRING SERIES
Discharge: HOME | End: 2023-04-27
Payer: COMMERCIAL

## 2023-04-24 PROCEDURE — 97164 PT RE-EVAL EST PLAN CARE: CPT

## 2023-04-24 PROCEDURE — 97530 THERAPEUTIC ACTIVITIES: CPT

## 2023-04-24 NOTE — PROGRESS NOTES
application    [] other:        Sx Level (0-10 scale) post treatment: 0/10    ASSESSMENT/Changes in Function:   Pt participates in PT reassessment today to assess pt progress in therapy this reporting period. Session initiated with  self-stretching and balance assessments. Today's Tx session emphasized exercises per POC to improve cervical ROM and static/dynamic balance with Pt tolerating Tx with no adverse effects. Pt demonstrates improved SLS balance when performing exercises during today's Tx session. Pt HEP updated and pt was educated on exercise dosing, vestibular pathology, and safety awareness. Pt is independent with HEP, is scheduled to see another neurologist, and get another MRI as her sx have improved but are not completely resolved. She is DC today to independent HEP.      []  See Plan of Care  []  See progress note/recertification  [x]  See Discharge Summary             PLAN  []  Upgrade activities as tolerated     []  Continue plan of care  []  Update interventions per flow sheet       [x]  Discharge due to: Met/progressing toward goals  []  Other:_      Guillermo Soares PT, DPT 4/24/2023  10:56 AM

## 2023-04-24 NOTE — THERAPY DISCHARGE
[address] [place] [address] PHYSICAL THERAPY  [address] [place] [address] Dr ROBERT, [address] [facility], [address] * Phone: [phone] * Fax: [phone]  DISCHARGE SUMMARY FOR PHYSICAL THERAPY            Patient Name: Cely Potts : 1975   Treatment/Medical Diagnosis: Dizziness and giddiness [R42]   Onset Date:  22    [facility]   Prior Hospitalization: See Medical History Provider #: [id number]   Prior Level of Function:  Independent   Comorbidities:  headaches   Medications: Verified on Patient Summary List   Visits from [facility]: 15 Missed Visits: 2       Subjective:  Pt has met or is progressing toward their goals, they have improved functional mobility, and they are independent with their HEP. Objective:  Objective Measures: FOTO: 57 (today);  46 (intake)     ROM:                   AROM                                   Cervical %         Flex 100         Ext 100         L          R          L Rotation  R Rotation 90  90            Goal Status Final:   Short Term Goals: 3 weeks  Pt will report compliance and demonstrate ability to correctly perform HEP in 3 weeks. -MET  Pt will report < 2/10 vertigo sx when performing all functional activity in 3 weeks. -MET    Pt will safely complete SLS balance test on LLE for 20 seconds in order to improve static/dynamic balance to improve functional mobility and decrease fall risk. -MET     Long Term Goals: 6 weeks  Pt will report 0/10 vertigo sx when performing all functional activity in 6 weeks. -MET  Pt will safely ambulate for 500 feet independently with no sx of dizziness or LOB in order to increase BLE endurance, improve coordination, and reduce fall risk. Pt will increase gross l AROM to 100% to be able to perform leisure activities of choice in 6 weeks.  -NOT MET: see chart above  Pt will safely complete SLS balance test on LLE for 30 seconds in order to improve static/dynamic

## 2023-05-18 ENCOUNTER — OFFICE VISIT (OUTPATIENT)
Age: 48
End: 2023-05-18
Payer: COMMERCIAL

## 2023-05-18 VITALS
OXYGEN SATURATION: 99 % | WEIGHT: 172.6 LBS | HEART RATE: 85 BPM | DIASTOLIC BLOOD PRESSURE: 72 MMHG | TEMPERATURE: 98.2 F | HEIGHT: 61 IN | SYSTOLIC BLOOD PRESSURE: 122 MMHG | RESPIRATION RATE: 20 BRPM | BODY MASS INDEX: 32.59 KG/M2

## 2023-05-18 DIAGNOSIS — R90.82 WHITE MATTER DISEASE: Primary | ICD-10-CM

## 2023-05-18 DIAGNOSIS — R42 DIZZINESS: ICD-10-CM

## 2023-05-18 PROCEDURE — 99204 OFFICE O/P NEW MOD 45 MIN: CPT | Performed by: STUDENT IN AN ORGANIZED HEALTH CARE EDUCATION/TRAINING PROGRAM

## 2023-05-18 ASSESSMENT — ENCOUNTER SYMPTOMS
COUGH: 0
SHORTNESS OF BREATH: 0
BACK PAIN: 1
NAUSEA: 0
VOMITING: 0

## 2023-05-18 NOTE — PROGRESS NOTES
Gogo Jerez is a 49 y.o. female new patient in office today to discuss headaches and dizziness; as referred by Supa Hardy NP. Patient reports HA pain 3/10.

## 2023-05-18 NOTE — PROGRESS NOTES
Dixie Black is a 49 y.o. female . presents for New Patient and Headache   A 49years old female patient referred here for evaluation of dizziness/unsteadiness since November 2022. She was seen in the emergency room in November for the dizziness: Had a spinning sensation, unsteadiness and difficulty working with self. Might have had a passing out spell. Was associated with headache. When having the spinning sensation, she feels in a bandlike distribution. Not associated with nausea or vomiting. Episodes last for a short duration. Might have spells in standing or sitting position. Still feels unsteady when walking. No falls. No extremity weakness. No diplopia. No significant changes in her vision other than some difficulty reading. She currently has headaches about 3 times a week. Gets better with medications. She initially feels an ear clogging sensation with ringing: Pain is mostly behind her ear and occipital area. Aching but occasionally Throbbing. No photophobia or phonophobia. No past history of migraine. No history of head trauma. No significant changes in her hearing. No numbness or tingling over her extremities. No incontinence. No neck pain or Lhermitte's-like phenomenon. In the emergency room in November, CT scan of the head was done which had shown white matter disease greater than expected for her age. Subsequently, patient was seen by neurology [Tonny]. An MRI of the brain was done which showed extensive white matter changes in the periventricular area; also over the brainstem and cerebellum. She takes meclizine, but also has diazepam.  Extremely claustrophobic. Review of Systems   Constitutional:  Negative for chills, diaphoresis, fever and unexpected weight change. HENT:  Positive for tinnitus. Negative for ear pain and hearing loss. Eyes:  Positive for visual disturbance (difficulty reading).    Respiratory:  Negative for cough and shortness of

## 2023-05-22 ENCOUNTER — TELEPHONE (OUTPATIENT)
Age: 48
End: 2023-05-22

## 2023-06-26 ENCOUNTER — TELEPHONE (OUTPATIENT)
Age: 48
End: 2023-06-26

## 2023-07-05 ENCOUNTER — TELEPHONE (OUTPATIENT)
Age: 48
End: 2023-07-05

## 2023-07-05 NOTE — TELEPHONE ENCOUNTER
Patient calling to report she really struggles with MRIs even with taking the PO sedation for them. She has tried an open MRI in the past and still has concerns. Please advise on other options.

## 2023-07-05 NOTE — TELEPHONE ENCOUNTER
Spoke with patient. She has decided to go with the open MRI and PO medication option. She will call back with her scheduled visit date.

## 2023-07-24 ENCOUNTER — TELEPHONE (OUTPATIENT)
Facility: CLINIC | Age: 48
End: 2023-07-24

## 2023-07-24 DIAGNOSIS — Z11.4 SCREENING FOR HIV WITHOUT PRESENCE OF RISK FACTORS: Primary | ICD-10-CM

## 2023-07-24 DIAGNOSIS — Z13.220 SCREENING CHOLESTEROL LEVEL: ICD-10-CM

## 2023-07-24 DIAGNOSIS — R42 DIZZINESS AND GIDDINESS: ICD-10-CM

## 2023-07-24 DIAGNOSIS — G44.011 EPISODIC CLUSTER HEADACHE, INTRACTABLE: ICD-10-CM

## 2023-07-24 DIAGNOSIS — E55.9 VITAMIN D DEFICIENCY, UNSPECIFIED: ICD-10-CM

## 2023-07-24 NOTE — TELEPHONE ENCOUNTER
Patient wanted to know if she could have labs done she had vertigo back in November and still has this imbalance going on. She wants to know if she can also get blood work done to get her glucose and vitamin D checked as well. She is worried something is going on.      Phone number [phone]

## 2023-07-28 ENCOUNTER — TELEPHONE (OUTPATIENT)
Facility: CLINIC | Age: 48
End: 2023-07-28

## 2023-07-28 DIAGNOSIS — E53.8 B12 DEFICIENCY: Primary | ICD-10-CM

## 2023-07-28 NOTE — TELEPHONE ENCOUNTER
Patient called stating her blood presser had been high and she doesn't know what she should do about it. She would like the nurse or NP to call her back so she can talk about it.     Call back number is [phone]

## 2023-07-31 ENCOUNTER — HOSPITAL ENCOUNTER (OUTPATIENT)
Age: 48
Discharge: HOME | End: 2023-08-03
Payer: COMMERCIAL

## 2023-07-31 DIAGNOSIS — Z13.220 SCREENING CHOLESTEROL LEVEL: ICD-10-CM

## 2023-07-31 DIAGNOSIS — R42 DIZZINESS AND GIDDINESS: ICD-10-CM

## 2023-07-31 DIAGNOSIS — E55.9 VITAMIN D DEFICIENCY, UNSPECIFIED: ICD-10-CM

## 2023-07-31 DIAGNOSIS — Z11.4 SCREENING FOR HIV WITHOUT PRESENCE OF RISK FACTORS: ICD-10-CM

## 2023-07-31 DIAGNOSIS — G44.011 EPISODIC CLUSTER HEADACHE, INTRACTABLE: ICD-10-CM

## 2023-07-31 LAB
25(OH)D3 SERPL-MCNC: 76.7 NG/ML (ref 30–100)
ALBUMIN SERPL-MCNC: 3.5 G/DL (ref 3.4–5)
ALBUMIN/GLOB SERPL: 0.8 (ref 0.8–1.7)
ALP SERPL-CCNC: 69 U/L (ref 45–117)
ALT SERPL-CCNC: 17 U/L (ref 13–56)
ANION GAP SERPL CALC-SCNC: 5 MMOL/L (ref 3–18)
AST SERPL W P-5'-P-CCNC: 12 U/L (ref 10–38)
BASOPHILS # BLD: 0.1 K/UL (ref 0–0.1)
BASOPHILS NFR BLD: 1 % (ref 0–2)
BILIRUB SERPL-MCNC: 0.5 MG/DL (ref 0.2–1)
BUN SERPL-MCNC: 13 MG/DL (ref 7–18)
BUN/CREAT SERPL: 13 (ref 12–20)
CA-I BLD-MCNC: 9.2 MG/DL (ref 8.5–10.1)
CHLORIDE SERPL-SCNC: 107 MMOL/L (ref 100–111)
CHOLEST SERPL-MCNC: 254 MG/DL
CO2 SERPL-SCNC: 28 MMOL/L (ref 21–32)
CREAT SERPL-MCNC: 1.04 MG/DL (ref 0.6–1.3)
DIFFERENTIAL METHOD BLD: ABNORMAL
EOSINOPHIL # BLD: 0.3 K/UL (ref 0–0.4)
EOSINOPHIL NFR BLD: 3 % (ref 0–5)
ERYTHROCYTE [DISTWIDTH] IN BLOOD BY AUTOMATED COUNT: 12.9 % (ref 11.6–14.5)
EST. AVERAGE GLUCOSE BLD GHB EST-MCNC: 108 MG/DL
GLOBULIN SER CALC-MCNC: 4.4 G/DL (ref 2–4)
GLUCOSE SERPL-MCNC: 102 MG/DL (ref 74–99)
HBA1C MFR BLD: 5.4 % (ref 4.2–5.6)
HCT VFR BLD AUTO: 42.5 % (ref 35–45)
HDLC SERPL-MCNC: 60 MG/DL (ref 40–60)
HDLC SERPL: 4.2 (ref 0–5)
HGB BLD-MCNC: 13.2 G/DL (ref 12–16)
IMM GRANULOCYTES # BLD AUTO: 0.1 K/UL (ref 0–0.04)
IMM GRANULOCYTES NFR BLD AUTO: 1 % (ref 0–0.5)
LDLC SERPL CALC-MCNC: 176 MG/DL (ref 0–100)
LIPID PANEL: ABNORMAL
LYMPHOCYTES # BLD: 2.4 K/UL (ref 0.9–3.6)
LYMPHOCYTES NFR BLD: 26 % (ref 21–52)
MCH RBC QN AUTO: 30.8 PG (ref 24–34)
MCHC RBC AUTO-ENTMCNC: 31.1 G/DL (ref 31–37)
MCV RBC AUTO: 99.3 FL (ref 78–100)
MONOCYTES # BLD: 0.6 K/UL (ref 0.05–1.2)
MONOCYTES NFR BLD: 6 % (ref 3–10)
NEUTS SEG # BLD: 5.7 K/UL (ref 1.8–8)
NEUTS SEG NFR BLD: 63 % (ref 40–73)
NRBC # BLD: 0 K/UL (ref 0–0.01)
NRBC BLD-RTO: 0 PER 100 WBC
PLATELET # BLD AUTO: 261 K/UL (ref 135–420)
PMV BLD AUTO: 11.9 FL (ref 9.2–11.8)
POTASSIUM SERPL-SCNC: 3.7 MMOL/L (ref 3.5–5.5)
PROT SERPL-MCNC: 7.9 G/DL (ref 6.4–8.2)
RBC # BLD AUTO: 4.28 M/UL (ref 4.2–5.3)
SODIUM SERPL-SCNC: 140 MMOL/L (ref 136–145)
TRIGL SERPL-MCNC: 90 MG/DL
TSH SERPL DL<=0.05 MIU/L-ACNC: 5.25 UIU/ML (ref 0.36–3.74)
VIT B12 SERPL-MCNC: 166 PG/ML (ref 211–911)
VLDLC SERPL CALC-MCNC: 18 MG/DL
WBC # BLD AUTO: 9.1 K/UL (ref 4.6–13.2)

## 2023-07-31 PROCEDURE — 82607 VITAMIN B-12: CPT

## 2023-07-31 PROCEDURE — 82306 VITAMIN D 25 HYDROXY: CPT

## 2023-07-31 PROCEDURE — 83036 HEMOGLOBIN GLYCOSYLATED A1C: CPT

## 2023-07-31 PROCEDURE — 80053 COMPREHEN METABOLIC PANEL: CPT

## 2023-07-31 PROCEDURE — 87389 HIV-1 AG W/HIV-1&-2 AB AG IA: CPT

## 2023-07-31 PROCEDURE — 85025 COMPLETE CBC W/AUTO DIFF WBC: CPT

## 2023-07-31 PROCEDURE — 36415 COLL VENOUS BLD VENIPUNCTURE: CPT

## 2023-07-31 PROCEDURE — 84443 ASSAY THYROID STIM HORMONE: CPT

## 2023-07-31 PROCEDURE — 80061 LIPID PANEL: CPT

## 2023-08-01 LAB
HIV 1+2 AB+HIV1 P24 AG SERPL QL IA: NONREACTIVE
HIV 1/2 RESULT COMMENT: NORMAL

## 2023-08-01 RX ORDER — CHOLECALCIFEROL (VITAMIN D3) 125 MCG
500 CAPSULE ORAL EVERY OTHER DAY
Qty: 45 TABLET | Refills: 1 | Status: SHIPPED | OUTPATIENT
Start: 2023-08-01 | End: 2024-01-28

## 2023-08-01 NOTE — TELEPHONE ENCOUNTER
Spoke with via phone call on 68/1/2023. B12 = 166 on 7/31/2023 and prescribed Cobalamin (B12) 500 Mcg Tablet Every Other Day. Patient Reports Her Blood Pressure Is Elevated with Highest Reading 142/89. She Reports She Feels Very Stressed with Chronic Dizziness As Well As Upcoming MRI, Which She Feels Would Be Very Difficult Due To Her Claustrophobia. Patient Will Follow-Up with Neurology Upcoming. Advised Patient to Check Her Blood Pressures and Contact This Provider If They Remain Elevated. Patient Rested and No Further Questions at This Time.

## 2023-08-28 ENCOUNTER — OFFICE VISIT (OUTPATIENT)
Facility: CLINIC | Age: 48
End: 2023-08-28
Payer: COMMERCIAL

## 2023-08-28 VITALS
SYSTOLIC BLOOD PRESSURE: 132 MMHG | BODY MASS INDEX: 33.23 KG/M2 | WEIGHT: 176 LBS | HEIGHT: 61 IN | DIASTOLIC BLOOD PRESSURE: 86 MMHG | RESPIRATION RATE: 18 BRPM | HEART RATE: 87 BPM | TEMPERATURE: 98 F

## 2023-08-28 DIAGNOSIS — F40.240 CLAUSTROPHOBIA: ICD-10-CM

## 2023-08-28 DIAGNOSIS — G43.111 INTRACTABLE MIGRAINE WITH AURA WITH STATUS MIGRAINOSUS: Primary | ICD-10-CM

## 2023-08-28 DIAGNOSIS — F41.9 ANXIETY: ICD-10-CM

## 2023-08-28 PROCEDURE — 99214 OFFICE O/P EST MOD 30 MIN: CPT | Performed by: NURSE PRACTITIONER

## 2023-08-28 RX ORDER — RIZATRIPTAN BENZOATE 10 MG/1
TABLET ORAL
Qty: 20 TABLET | Refills: 0 | Status: SHIPPED | OUTPATIENT
Start: 2023-08-28

## 2023-08-28 RX ORDER — DIAZEPAM 5 MG/1
TABLET ORAL
Qty: 8 TABLET | Refills: 0 | Status: SHIPPED | OUTPATIENT
Start: 2023-08-28 | End: 2023-10-28

## 2023-08-28 RX ORDER — ESCITALOPRAM OXALATE 5 MG/1
5 TABLET ORAL DAILY
Qty: 90 TABLET | Refills: 1 | Status: SHIPPED | OUTPATIENT
Start: 2023-08-28

## 2023-08-28 NOTE — PROGRESS NOTES
Joe Bernheim presents today for   Chief Complaint   Patient presents with    Follow-up     3m follow up. Pt reports still having headaches and feeling like a rocking boat. Is someone accompanying this pt? Yes,      Is the patient using any DME equipment during OV? no    Health Maintenance reviewed and discussed and ordered per Provider. Health Maintenance Due   Topic Date Due    Hepatitis C screen  Never done    DTaP/Tdap/Td vaccine (1 - Tdap) Never done    Colorectal Cancer Screen  Never done    Flu vaccine (1) Never done   . Coordination of Care:  1. \"Have you been to the ER, urgent care clinic since your last visit? Hospitalized since your last visit? \" No    2. \"Have you seen or consulted any other health care providers outside of the [address] [place] [address] since your last visit? \" No    3. For patients aged 43-73: Has the patient had a colonoscopy? No    If the patient is female:    4. For patients aged 43-66: Has the patient had a mammogram within the past 2 years? Yes- No care gap present    5. For patients aged 21-65: Has the patient had a pap smear?  Yes- No care gap present

## 2023-08-28 NOTE — PROGRESS NOTES
History of Present Illness  Kristopher Torres is a 51 y.o. female who presents today for:    Chief Complaint   Patient presents with    Follow-up     3m follow up. Pt reports still having headaches and feeling like a rocking boat. Past Medical History  No past medical history on file. Surgical History  Past Surgical History:   Procedure Laterality Date    GYN      ORTHOPEDIC SURGERY          Current Medications  Current Outpatient Medications   Medication Sig    vitamin B-12 (CYANOCOBALAMIN) 500 MCG tablet Take 1 tablet by mouth every other day    ergocalciferol (ERGOCALCIFEROL) 1.25 MG (86925 UT) capsule TAKE 1 CAPSULE BY MOUTH 1 TIME A WEEK for 90    verapamil (CALAN) 120 MG tablet Take 120 mg by mouth 2 times daily (Patient not taking: Reported on 3/27/2023)    topiramate (TOPAMAX) 50 MG tablet TAKE 1/2 TABLET BY MOUTH EVERY NIGHT AT BEDTIME FOR 1 DOSE AS DIRECTED THEN TAKE 1 TABLET BY MOUTH EVERY NIGHT AT BEDTIME (Patient not taking: Reported on 3/27/2023)    diazePAM (VALIUM) 5 MG tablet Take 5 mg by mouth. (Patient not taking: Reported on 3/27/2023)     No current facility-administered medications for this visit. Allergies/Drug Reactions  No Known Allergies     Family History  No family history on file.      Social History  Social History     Tobacco Use    Smoking status: Never     Passive exposure: Never    Smokeless tobacco: Never   Substance Use Topics    Alcohol use: Not Currently    Drug use: Not Currently        Health Maintenance   Topic Date Due    Hepatitis C screen  Never done    DTaP/Tdap/Td vaccine (1 - Tdap) Never done    Colorectal Cancer Screen  Never done    Flu vaccine (1) Never done    COVID-19 Vaccine (1) 12/31/2024 (Originally 1975)    Depression Screen  03/27/2024    Diabetes screen  07/31/2026    Cervical cancer screen  02/20/2028    Lipids  07/31/2028    HIV screen  Completed    Hepatitis A vaccine  Aged Out    Hib vaccine  Aged Out    Meningococcal (ACWY)

## 2023-08-31 ENCOUNTER — OFFICE VISIT (OUTPATIENT)
Age: 48
End: 2023-08-31
Payer: COMMERCIAL

## 2023-08-31 VITALS
HEIGHT: 61 IN | RESPIRATION RATE: 20 BRPM | BODY MASS INDEX: 33.72 KG/M2 | WEIGHT: 178.6 LBS | TEMPERATURE: 97.6 F | SYSTOLIC BLOOD PRESSURE: 118 MMHG | DIASTOLIC BLOOD PRESSURE: 82 MMHG | OXYGEN SATURATION: 97 % | HEART RATE: 69 BPM

## 2023-08-31 DIAGNOSIS — R42 DIZZINESS: ICD-10-CM

## 2023-08-31 DIAGNOSIS — F40.240 CLAUSTROPHOBIA: ICD-10-CM

## 2023-08-31 DIAGNOSIS — R26.81 UNSTEADINESS: Primary | ICD-10-CM

## 2023-08-31 PROCEDURE — 99214 OFFICE O/P EST MOD 30 MIN: CPT | Performed by: STUDENT IN AN ORGANIZED HEALTH CARE EDUCATION/TRAINING PROGRAM

## 2023-08-31 RX ORDER — LORAZEPAM 2 MG/1
2 TABLET ORAL ONCE
Qty: 1 TABLET | Refills: 0 | Status: SHIPPED | OUTPATIENT
Start: 2023-08-31 | End: 2023-08-31

## 2023-08-31 ASSESSMENT — ENCOUNTER SYMPTOMS
BACK PAIN: 1
NAUSEA: 0
SHORTNESS OF BREATH: 0
COUGH: 0
VOMITING: 0

## 2023-08-31 NOTE — PROGRESS NOTES
25-Hydroxy 07/31/2023 76.7  30 - 100 ng/mL Final    Comment: (NOTE)  Deficiency               <20 ng/mL  Insufficiency          20-30 ng/mL  Sufficient             ng/mL  Possible toxicity       >100 ng/mL    The Method used is Siemens Advia Centaur currently standardized to a   Center of Disease Control and Prevention (CDC) certified reference   [address] [address]. Samples containing fluorescein dye can produce falsely   elevated values when tested with the ADVIA Centaur Vitamin D Assay. It is recommended that results in the toxic range, >100 ng/mL, be   retested 72 hours post fluorescein exposure. Assessment:  1. Unsteadiness  - External Referral To Physical Therapy  2. Dizziness  - External Referral To Physical Therapy  3. Claustrophobia  - LORazepam (ATIVAN) 2 MG tablet; Take 1 tablet by mouth once for 1 dose. Take 2 mg 30 min before MRI. Max Daily Amount: 2 mg  Dispense: 1 tablet; Refill: 0   A 51years old female patient here for follow-up of unsteadiness/dizziness. Last seen in the clinic in May. An order for MRI of the brain and spinal cord with and without contrast placed. Patient states that she is extremely claustrophobic and unable to get it. The previous MRI had shown extensive white matter changes with a strong differential of a demyelinating disease. Discussed different options to get the MRI. She will try to get the MRI with lorazepam/Ativan. Since she still has unsteadiness/dizziness, will refer her to physical therapy. Fall precautions. Follow-up in 4 months time. PLEASE NOTE:   This document has been produced using voice recognition software. Unrecognized errors in transcription may be present.

## 2023-09-15 ENCOUNTER — HOSPITAL ENCOUNTER (OUTPATIENT)
Age: 48
Setting detail: RECURRING SERIES
Discharge: HOME | End: 2023-09-18
Payer: COMMERCIAL

## 2023-09-15 PROCEDURE — 97163 PT EVAL HIGH COMPLEX 45 MIN: CPT

## 2023-09-15 NOTE — THERAPY EVALUATION
[address] [place] [address], [id number] [place] [address], [facility]  PLAN OF CARE / [facility] FOR PHYSICAL [facility]ERAP[facility]  Patient Name: Jewel Park : 1975   Medical   Diagnosis: Dizziness and giddiness [R42]  Unsteadiness on feet [R26.81] Treatment Diagnosis: Decreased Balance    Onset Date: 22     Referral Source: ELKE Murphy Start of Care [facility]): 9/15/2023   Prior Hospitalization: See medical history Provider #: [id number]   Prior Level of Function: Independent   Comorbidities: None pertinent   Medications: Verified on Patient Summary List   The Plan of Care and following information is based on the information from the initial evaluation.   ==========================================================================================  Assessment / Functional Analysis:    Pt is a 51y.o. year old  female who presents to outpatient clinic today with chief complaint of decreased balance. Pt familiar to this clinic as was seen Feb - April earlier this year for dizziness. Pt states she is not dizzy now. Pt is an independent ambulator and presents with impairments that include decreased sitting & standing balance, unilateral instability, decreased proprioception. Pt is scheduled for additional imaging to better understand cause of symptoms. Pt encouraged to undergo MRI for optimal treatment.  Pt may benefit from skilled PT intervention to target deficits in effort to maximize functional mobility safety, confidence within home & community.    ==========================================================================================  Eval Complexity: History: MEDIUM  Complexity : 1-2 comorbidities / personal factors will impact the outcome/ POC Exam:MEDIUM Complexity : 3 Standardized tests and measures addressin body structure, function, activity limitation and / or participation in recreation  Presentation: HIGH
Describe:       Heel - Barker  [x] [facility]    [] Impaired    Describe:       FNF   [x] [facility]    [] Impaired    Describe: Toe Tap   [x] [facility]    [] Impaired    Describe:    Oculomotor Tests: (Fixation Not Blocked)       Ocular ROM:   [x] WFL    [] Limited    Describe:       Spontaneous Nystag. [] Neg     [x] Pos    [] Left    [] Right       Gaze Holding Nystag. [] Neg     [x] Pos    [] Left    [] Right        Smooth Pursuit  [] Neg     [x] Pos    [] Left    [] Right        Saccades   [] Neg     [x] Pos    [] Left    [] Right        VOR - Slow Head Mvmt [] Neg     [x] Pos    [] Left    [] Right        VOR - Fast Head Mvmt [] Neg     [] Pos    [] Left    [] Right          Oculomotor Tests: (Fixation Blocked)       Spontaneous Nystag. [] Neg     [] Pos    [] Left    [] Right       Gaze Holding Nystag. [] Neg     [] Pos    [] Left    [] Right        Head-Shaking Nystag. [] Neg     [] Pos    [] Left    [] Right    Other Special Tests:       Vertebral Artery Testing [] Neg     [] Pos    [] Left    [] Right       Hallpike-[place] Maneuver [] Neg     [] Pos    [] Left    [] Right       Roll Test   [] Neg     [] Pos    [] Left    [] Right       Goldstein Balance Scale [] Neg     [] Pos    Score:       Dynamic Gait Index [] Neg     [] Pos    Score:       Functional Gait Assess.  [] Neg     [] Pos    Score:    Balance Standard Testing (Eyes Open/Eyes Closed - EO/EC)       Romberg   [] WFL    [x] Pos    Describe: 23 seconds with right leading, 14 seconds with left leading           Stand on Foam  [] WFL    [x] Pos    Describe: posterior LOB        Single Leg Stand  [] WFL    [x] Pos    Describe: 18 seconds on left, 30 seconds on right      Motion Sensitivity:  [] Neg     [x] Pos    Describe:          38 min [x]Eval                  []Re-Eval           With   [] TE   [] TA   [] neuro   [x] other: Patient Education: [x] Review HEP    [] Progressed/Changed HEP based on:   [] positioning   [] body mechanics   [] transfers   [] heat/ice

## 2023-09-19 ENCOUNTER — HOSPITAL ENCOUNTER (OUTPATIENT)
Age: 48
Setting detail: RECURRING SERIES
Discharge: HOME | End: 2023-09-22
Payer: COMMERCIAL

## 2023-09-19 PROCEDURE — 97530 THERAPEUTIC ACTIVITIES: CPT

## 2023-09-19 PROCEDURE — 97110 THERAPEUTIC EXERCISES: CPT

## 2023-09-28 ENCOUNTER — HOSPITAL ENCOUNTER (OUTPATIENT)
Age: 48
Setting detail: RECURRING SERIES
End: 2023-09-28
Payer: COMMERCIAL

## 2023-09-28 PROCEDURE — 97530 THERAPEUTIC ACTIVITIES: CPT

## 2023-09-28 PROCEDURE — 97110 THERAPEUTIC EXERCISES: CPT

## 2023-10-03 ENCOUNTER — HOSPITAL ENCOUNTER (OUTPATIENT)
Age: 48
Setting detail: RECURRING SERIES
Discharge: HOME | End: 2023-10-06
Payer: COMMERCIAL

## 2023-10-03 PROCEDURE — 97110 THERAPEUTIC EXERCISES: CPT

## 2023-10-03 PROCEDURE — 97530 THERAPEUTIC ACTIVITIES: CPT

## 2023-10-03 NOTE — PROGRESS NOTES
down. When asked to march in place starting in the corner with eyes closed on first trial pt advanced 3 feet forward in one minute. On second trial pt made left turn and marched 3 feet moving to her left. Pt reported feeling unsteady, she describes as \"wobbly- like moving on a boat\" throughout today's session. Plan to continue POC as able next week. Patient will continue to benefit from skilled PT services to modify and progress therapeutic interventions, analyze and address functional mobility deficits, analyze and address ROM deficits, analyze and address strength deficits, analyze and address soft tissue restrictions, analyze and cue for proper movement patterns, analyze and modify for postural abnormalities, and analyze and address imbalance/dizziness to attain remaining goals.      [x]  See Plan of Care  []  See progress note/recertification  []  See Discharge Summary       PLAN  [x]  Upgrade activities as tolerated     [x]  Continue plan of care  []  Update interventions per flow sheet       []  Discharge due to:_  []  Other:_      KEN Guzmán  10/3/2023  6:16 PM

## 2023-10-05 ENCOUNTER — APPOINTMENT (OUTPATIENT)
Age: 48
End: 2023-10-05
Payer: COMMERCIAL

## 2023-10-12 ENCOUNTER — HOSPITAL ENCOUNTER (OUTPATIENT)
Age: 48
Setting detail: RECURRING SERIES
End: 2023-10-12
Payer: COMMERCIAL

## 2023-10-26 ENCOUNTER — HOSPITAL ENCOUNTER (OUTPATIENT)
Age: 48
Setting detail: RECURRING SERIES
Discharge: HOME | End: 2023-10-29
Payer: COMMERCIAL

## 2023-10-26 PROCEDURE — 97110 THERAPEUTIC EXERCISES: CPT

## 2023-10-26 PROCEDURE — 97112 NEUROMUSCULAR REEDUCATION: CPT

## 2023-10-31 ENCOUNTER — APPOINTMENT (OUTPATIENT)
Age: 48
End: 2023-10-31
Payer: COMMERCIAL

## 2023-11-08 ENCOUNTER — TELEPHONE (OUTPATIENT)
Age: 48
End: 2023-11-08

## 2023-11-08 NOTE — TELEPHONE ENCOUNTER
Patient called stating she is still having trouble getting her mri done,stated she would like her order to go to [facility] imaging

## 2023-12-28 ENCOUNTER — OFFICE VISIT (OUTPATIENT)
Age: 48
End: 2023-12-28
Payer: COMMERCIAL

## 2023-12-28 VITALS
WEIGHT: 176 LBS | SYSTOLIC BLOOD PRESSURE: 120 MMHG | HEART RATE: 74 BPM | BODY MASS INDEX: 33.23 KG/M2 | DIASTOLIC BLOOD PRESSURE: 70 MMHG | OXYGEN SATURATION: 98 % | HEIGHT: 61 IN

## 2023-12-28 DIAGNOSIS — R26.81 UNSTEADINESS: Primary | ICD-10-CM

## 2023-12-28 PROCEDURE — 99212 OFFICE O/P EST SF 10 MIN: CPT | Performed by: PSYCHIATRY & NEUROLOGY

## 2023-12-28 RX ORDER — PREDNISONE 20 MG/1
TABLET ORAL
Qty: 18 TABLET | Refills: 0 | Status: SHIPPED | OUTPATIENT
Start: 2023-12-28 | End: 2024-01-11

## 2023-12-28 NOTE — PROGRESS NOTES
She is here for a follow-up visit. She reports that she continues to have balance difficulties. She has not worked since August.  She does not report any new symptoms compared to her last visit. She is alert and appropriate. Her speech is fluent and clear. Cranial nerves are intact. She has equal muscle strength. She walks with a slightly wide-based gait, but without any type of device. MRI with and without contrast was reviewed. There are 2 areas of enhancement noted superimposed on moderately extensive white matter changes. Radiology reports that the degree of white matter change has progressed compared to her prior imaging in February. We discussed the possibility of a lumbar puncture for further analysis. The patient is refusing to proceed with this. Her B12 level was subtherapeutic at 166. She was advised to start on B12 drops. Assessment:  Given her pattern of symptoms, she more than likely has chronic progressive disease rather than relapsing remitting. I will give her a short course of steroids and have her return for a follow-up visit to discuss possibly starting Ocrevus.

## 2024-01-22 RX ORDER — ERGOCALCIFEROL 1.25 MG/1
50000 CAPSULE ORAL
Qty: 13 CAPSULE | Refills: 3 | Status: SHIPPED | OUTPATIENT
Start: 2024-01-22

## 2024-01-23 ENCOUNTER — TELEPHONE (OUTPATIENT)
Age: 49
End: 2024-01-23

## 2024-01-23 NOTE — TELEPHONE ENCOUNTER
Spoke with patient. She is requesting an order for a spinal tap after getting a second opinion. Patient initially declined order as discussed in her last office visit with Dr. Sanchez. I explained to the patient that the provider is a [facility] and is not here to now order the testing. Scheduled follow-up is currently with Dr. Barraza in March. Transferred to [facility] for possible sooner appointment.

## 2024-02-08 ENCOUNTER — OFFICE VISIT (OUTPATIENT)
Age: 49
End: 2024-02-08
Payer: COMMERCIAL

## 2024-02-08 VITALS
HEIGHT: 61 IN | OXYGEN SATURATION: 100 % | SYSTOLIC BLOOD PRESSURE: 138 MMHG | TEMPERATURE: 98.4 F | BODY MASS INDEX: 33.79 KG/M2 | DIASTOLIC BLOOD PRESSURE: 88 MMHG | RESPIRATION RATE: 20 BRPM | WEIGHT: 179 LBS | HEART RATE: 72 BPM

## 2024-02-08 DIAGNOSIS — G37.9 DEMYELINATING DISEASE OF CENTRAL NERVOUS SYSTEM (HCC): Primary | ICD-10-CM

## 2024-02-08 PROCEDURE — 99215 OFFICE O/P EST HI 40 MIN: CPT | Performed by: PSYCHIATRY & NEUROLOGY

## 2024-02-08 RX ORDER — MECLIZINE HYDROCHLORIDE 25 MG/1
25 TABLET ORAL 3 TIMES DAILY PRN
COMMUNITY

## 2024-02-08 NOTE — PROGRESS NOTES
Hayley Bear is a 48 y.o. female in office today for follow-up.     Patient reports less strength in bilateral hands, stiff legs, trouble walking, and bilateral leg pain 6-7/10.    1. Have you been to the ER, urgent care clinic since your last visit?  Hospitalized since your last visit?No    2. Have you seen or consulted any other health care providers outside of the [facility] since your last visit?  Include any pap smears or colon screening. No

## 2024-02-08 NOTE — PROGRESS NOTES
[facility]  [address], [place] [address]  Office:  [phone]  Fax: [phone]  Chief Complaint   Patient presents with    Follow-up       HPI: The patient is accompanied by her  today.  She is a 47 yo right-handed woman who began experiencing imbalance in November 2022.  She has been found to have some white matter lesions on brain MRI.  She has developed worsening pain in her legs and weakness in her hands that has worsened since her last visit with Dr. Sanchez in December 2023.  She took a short course of oral steroids (60 mg daily x 3 days, then 40 mg daily x 3 days then 20 mg daily x 3 days) without benefit.  She has intermittent blurred vision in both eyes.  She has been dropping several items (soda, drink, utensil, etc) from her hands.  She has had increasing difficulty brushing her teeth.  She has not had any injuries from falling.  She has had no difficulty controlling her bowel or bladder.       Review of Systems: She has had no fevers or chills; she continues to have pain in her legs and back      Physical Examination:  /88 (Site: Left Upper Arm, Position: Sitting, Cuff Size: Large Adult)   Pulse 72   Temp 98.4 °F (36.9 °C) (Oral)   Resp 20   Ht 1.549 m (5' 1\")   Wt 81.2 kg (179 lb)   LMP 02/07/2024 (Exact Date)   SpO2 100%   BMI 33.82 kg/m²     Gen: well-developed  woman sitting upright in no acute distress  Neuro: awake, fluent, face symmetric, pupils equal round and reactive, extraocular movements full, funduscopy unremarkable, strength 5/5 with normal muscle bulk and tone, MSRs 2+ globally, sensation intact to light touch, mild ataxia with difficulty performing heel-to-toe walk    No results found for this or any previous visit (from the past 24 hour(s)).      Impression/Plan:  Demyelinating disease.  I have personally reviewed the patient's brain MRI scan from December 2023 with her and her  today.  The study

## 2024-02-25 DIAGNOSIS — E53.8 B12 DEFICIENCY: ICD-10-CM

## 2024-02-26 ENCOUNTER — HOSPITAL ENCOUNTER (OUTPATIENT)
Facility: HOSPITAL | Age: 49
Discharge: HOME | End: 2024-02-26
Attending: PSYCHIATRY & NEUROLOGY | Admitting: RADIOLOGY
Payer: COMMERCIAL

## 2024-02-26 VITALS
HEART RATE: 86 BPM | WEIGHT: 176.8 LBS | OXYGEN SATURATION: 100 % | SYSTOLIC BLOOD PRESSURE: 120 MMHG | BODY MASS INDEX: 34.71 KG/M2 | RESPIRATION RATE: 20 BRPM | HEIGHT: 60 IN | DIASTOLIC BLOOD PRESSURE: 60 MMHG | TEMPERATURE: 98 F

## 2024-02-26 DIAGNOSIS — G37.9 DEMYELINATING DISEASE OF CENTRAL NERVOUS SYSTEM (HCC): ICD-10-CM

## 2024-02-26 LAB
GLUCOSE CSF-MCNC: 62 MG/DL (ref 40–70)
HCG UR QL: NEGATIVE
PROT CSF-MCNC: 56 MG/DL (ref 15–45)
TUBE # CSF: 1
TUBE # CSF: 1

## 2024-02-26 PROCEDURE — 82040 ASSAY OF SERUM ALBUMIN: CPT

## 2024-02-26 PROCEDURE — 87070 CULTURE OTHR SPECIMN AEROBIC: CPT

## 2024-02-26 PROCEDURE — 2500000003 HC RX 250 WO HCPCS: Performed by: PSYCHIATRY & NEUROLOGY

## 2024-02-26 PROCEDURE — 82042 OTHER SOURCE ALBUMIN QUAN EA: CPT

## 2024-02-26 PROCEDURE — 84157 ASSAY OF PROTEIN OTHER: CPT

## 2024-02-26 PROCEDURE — 83873 ASSAY OF CSF PROTEIN: CPT

## 2024-02-26 PROCEDURE — 82784 ASSAY IGA/IGD/IGG/IGM EACH: CPT

## 2024-02-26 PROCEDURE — 81025 URINE PREGNANCY TEST: CPT

## 2024-02-26 PROCEDURE — 62328 DX LMBR SPI PNXR W/FLUOR/CT: CPT

## 2024-02-26 PROCEDURE — 83916 OLIGOCLONAL BANDS: CPT

## 2024-02-26 PROCEDURE — 86381 MITOCHONDRIAL ANTIBODY EACH: CPT

## 2024-02-26 PROCEDURE — 87205 SMEAR GRAM STAIN: CPT

## 2024-02-26 PROCEDURE — 86362 MOG-IGG1 ANTB CBA EACH: CPT

## 2024-02-26 PROCEDURE — 86015 ACTIN ANTIBODY EACH: CPT

## 2024-02-26 PROCEDURE — 82945 GLUCOSE OTHER FLUID: CPT

## 2024-02-26 PROCEDURE — 86038 ANTINUCLEAR ANTIBODIES: CPT

## 2024-02-26 PROCEDURE — 7100000011 HC PHASE II RECOVERY - ADDTL 15 MIN

## 2024-02-26 PROCEDURE — 7100000010 HC PHASE II RECOVERY - FIRST 15 MIN

## 2024-02-26 RX ORDER — LIDOCAINE HYDROCHLORIDE 10 MG/ML
5 INJECTION, SOLUTION EPIDURAL; INFILTRATION; INTRACAUDAL; PERINEURAL ONCE
Status: DISCONTINUED | OUTPATIENT
Start: 2024-02-26 | End: 2024-02-26

## 2024-02-26 RX ORDER — LIDOCAINE HYDROCHLORIDE 10 MG/ML
5 INJECTION, SOLUTION EPIDURAL; INFILTRATION; INTRACAUDAL; PERINEURAL ONCE
Status: COMPLETED | OUTPATIENT
Start: 2024-02-26 | End: 2024-02-26

## 2024-02-26 RX ORDER — LANOLIN ALCOHOL/MO/W.PET/CERES
1000 CREAM (GRAM) TOPICAL DAILY
COMMUNITY

## 2024-02-26 RX ORDER — DIAZEPAM 5 MG/1
5 TABLET ORAL EVERY 6 HOURS PRN
COMMUNITY

## 2024-02-26 RX ORDER — CHOLECALCIFEROL (VITAMIN D3) 125 MCG
500 CAPSULE ORAL EVERY OTHER DAY
Qty: 45 TABLET | Refills: 1 | OUTPATIENT
Start: 2024-02-26 | End: 2024-08-24

## 2024-02-26 RX ADMIN — LIDOCAINE HYDROCHLORIDE 5 ML: 10 INJECTION, SOLUTION EPIDURAL; INFILTRATION; INTRACAUDAL; PERINEURAL at 14:35

## 2024-02-26 NOTE — DISCHARGE INSTRUCTIONS
LUMBAR PUNCTURE DISCHARGE INSTRUCTIONS        GENERAL INFORMATION:    A needle aspiration is removal of a small amount of tissue or fluid. The tissue or fluid is then examined in the laboratory.     INSTRUCTIONS:     Cover aspiration site with a sterile band aid.  Keep the area clean and dry until next shower, when you can discard the band aid.   Lie flat for next 12 hours post procedure.  You should be on a light activity schedule for the next 24-48 hours.  Drink extra fluids today and tomorrow.  You may take a non-aspirin pain reliever if necessary.        CALL YOUR REFERRING PHYSICIAN IF:     You have any sever redness or swelling, or temperature over 101°F.  You have any drainage of fluid from the lumbar site, especially if you have yellow or green discharge, or any bleeding more than a spot on the dressing.  You have any severe pain or bruising in the area for more than a few days.  You have worsening head or neck pain that lasts longer than 24 hours.   You have repeated vomiting.  You have reduced alertness or difficulty awakening.  You have dizziness or balance difficulties or visual blurring or disturbance.  You have light or noise sensitivity.  You have any increase in pain, weakness or numbness in your legs.  You have any problems urinating or having a bowel movement.     Your referring physician will call you with results in a few days.     We provide this literature for patients and family members.  It is intended to be educational supplement that highlights some of the important points of what we have previously discussed.       General Discharge: Care Instructions  Your Care Instructions     One or more doctors have given you a physical exam, reviewed your symptoms, and asked about your past medical problems. You have had tests as needed.  At this time, the doctors feel it is safe for you to go home.  It is very important that you follow up with your doctor as directed. If you continue to have

## 2024-02-26 NOTE — PROGRESS NOTES
1000 Pt arrives to unit accompanied by spouse.    1030 Pt is all prepped  and ready for procedure.    Attempted to do a pregnancy urine test but patient unable to produce any urine. Jose Graham notified. Patient and spouse are willing to go without the pregnancy test and say for sure they are not pregnant.  They understand the risks.     1040 Pt picked up for procedure.   1100 Pt back and given water to drink and was able to get a few drops , pregnancy test obtained and negative.     1145 Pt left for the procedure    1250 Pt back to care unit. Procedure tolerated fairly. Ban-aid to lower back dry and intact.Laying flat for one hour.     1350 Pt up sitting and eating and tolerated well. Discharge instructions reviewed with patient and spouse and they verbalized all understandings.Awaiting lab work to be drawn    1500 Lab drawn and pt escorted out via w/c to car and left with spouse in stable condition.

## 2024-02-27 LAB
BACTERIA SPEC CULT: NORMAL
GRAM STN SPEC: NORMAL
GRAM STN SPEC: NORMAL
SERVICE CMNT-IMP: NORMAL

## 2024-02-28 LAB
ALBUMIN CSF-MCNC: 24 MG/DL (ref 8–37)
ALBUMIN SERPL-MCNC: 4.4 G/DL (ref 3.9–4.9)
ANA TITR SER IF: NEGATIVE
AQP4 H2O CHANNEL IGG SERPL IA-ACNC: <1.5 U/ML (ref 0–3)
IGG CSF-MCNC: 13.3 MG/DL (ref 0–6.7)
IGG SERPL-MCNC: 1435 MG/DL (ref 586–1602)
IGG/ALB CLEAR SER+CSF-RTO: 1.7 (ref 0–0.7)
IGG/ALB CSF: 0.55 (ref 0–0.25)
LABORATORY COMMENT REPORT: NORMAL
MBP CSF-MCNC: 7.4 NG/ML (ref 0–3.7)

## 2024-02-29 LAB — OLIGOCLONAL BANDS.IT SER+CSF QL: NORMAL

## 2024-03-04 ENCOUNTER — TELEPHONE (OUTPATIENT)
Age: 49
End: 2024-03-04

## 2024-03-04 LAB
BACTERIA SPEC CULT: NORMAL
GRAM STN SPEC: NORMAL
GRAM STN SPEC: NORMAL
SERVICE CMNT-IMP: NORMAL

## 2024-03-29 ENCOUNTER — OFFICE VISIT (OUTPATIENT)
Age: 49
End: 2024-03-29
Payer: COMMERCIAL

## 2024-03-29 VITALS
DIASTOLIC BLOOD PRESSURE: 90 MMHG | HEART RATE: 82 BPM | HEIGHT: 60 IN | SYSTOLIC BLOOD PRESSURE: 110 MMHG | RESPIRATION RATE: 18 BRPM | WEIGHT: 183.6 LBS | TEMPERATURE: 98.2 F | OXYGEN SATURATION: 98 % | BODY MASS INDEX: 36.05 KG/M2

## 2024-03-29 DIAGNOSIS — G35 MULTIPLE SCLEROSIS (HCC): Primary | ICD-10-CM

## 2024-03-29 DIAGNOSIS — F40.240 CLAUSTROPHOBIA: ICD-10-CM

## 2024-03-29 DIAGNOSIS — R26.9 GAIT DIFFICULTY: ICD-10-CM

## 2024-03-29 DIAGNOSIS — R42 DIZZINESS: ICD-10-CM

## 2024-03-29 PROCEDURE — 99215 OFFICE O/P EST HI 40 MIN: CPT | Performed by: STUDENT IN AN ORGANIZED HEALTH CARE EDUCATION/TRAINING PROGRAM

## 2024-03-29 RX ORDER — MECLIZINE HYDROCHLORIDE 25 MG/1
25 TABLET ORAL 3 TIMES DAILY PRN
Qty: 60 TABLET | Refills: 3 | Status: SHIPPED | OUTPATIENT
Start: 2024-03-29

## 2024-03-29 RX ORDER — LORAZEPAM 2 MG/1
2 TABLET ORAL ONCE
Qty: 1 TABLET | Refills: 0 | Status: SHIPPED | OUTPATIENT
Start: 2024-03-29 | End: 2024-03-29

## 2024-03-29 ASSESSMENT — ENCOUNTER SYMPTOMS
BACK PAIN: 1
NAUSEA: 0
SHORTNESS OF BREATH: 0
COUGH: 0
VOMITING: 0

## 2024-03-29 NOTE — PROGRESS NOTES
Hayley Bear is a 48 y.o. female .presents for Follow-up  A 48 years old female patient here for follow-up of multiple sclerosis.  She came today with her .  Saw patient in September 2023; subsequently was seen by Dr. Sanchez and Dr. Gaming.  Last seen on February 8.  Had her MRI of the brain on December 26, 2023.  Had shown diffuse white matter changes with severe P2 burden; has progressed since her last MRI in February 2023 with a new several lesions with some enhancing.  Did not get MRIs of the cervical and thoracic spines.  Lumbar puncture was done on February 6.  It had shown 7 oligoclonal bands.  Serum NMO antibody was negative.  She complains of dizziness/unsteadiness.  Had a fall last week.  Not using any support.  Her legs feel stiff and weak.  Has muscle spasms in her back and legs.  Complains of tingling and numbness in the hands; also feels weak.  She might drop from her hands.  Also has blurring of vision; no diplopia.  Complains of neck pain; her neck feels stiff.  Might have sharp shooting pain down to the spine.  Has fatigue.  Has no incontinence to bowel or bladder.    From Initial Encounter:  A 47 years old female patient referred here for evaluation of dizziness/unsteadiness since November 2022.  She was seen in the emergency room in November for the dizziness: Had a spinning sensation, unsteadiness and difficulty working with self.  Might have had a passing out spell.  Was associated with headache.  When having the spinning sensation, she feels in a bandlike distribution.  Not associated with nausea or vomiting.  Episodes last for a short duration.  Might have spells in standing or sitting position.  Still feels unsteady when walking.  No falls.  No extremity weakness.  No diplopia.  No significant changes in her vision other than some difficulty reading.  She currently has headaches about 3 times a week.  Gets better with medications.  She initially feels an ear clogging

## 2024-04-12 ENCOUNTER — HOSPITAL ENCOUNTER (OUTPATIENT)
Age: 49
Setting detail: RECURRING SERIES
Discharge: HOME | End: 2024-04-15
Payer: COMMERCIAL

## 2024-04-12 PROCEDURE — 97163 PT EVAL HIGH COMPLEX 45 MIN: CPT

## 2024-04-12 PROCEDURE — 97112 NEUROMUSCULAR REEDUCATION: CPT

## 2024-04-12 NOTE — PLAN OF CARE
PROM            Ankle Left Right Left Right   Ext WNL WNL       Flex 10 10          Strength (MMT):  Shoulder L (1-5) R (1-5)   Shoulder Flexion 4 4   Shoulder Ext 4 4   Shoulder ABD 4 4   Shoulder ADD       Shoulder IR 4 4   Shoulder ER 4 4      Elbow & Wrist L (1-5) R (1-5)   Elbow Flexion 4 4   Elbow Extension 4 4                                         Hip L (1-5) R (1-5)   Hip Flexion 4 4   Hip Ext 4 4   Hip ABD 4 4   Hip ADD 4 4   Hip ER       Hip IR          Knee L (1-5) R (1-5)   Knee Flexion 3 3   Knee Extension 4 4      Ankle L (1-5) R (1-5)   Ankle DF 4+ 4+   Ankle PF 4 4   Ankle Inversion       Ankle Eversion          Catch and release noted with difficulty initiating movement, ataxia throughout upper and lower extremity.  Balance/ Equilibrium:          Sitting Balance: Static:        [x] Good    [] Fair    [] Poor                          Dynamic:         [] Good    [x] Fair    [] Poor         Standing Balance: Static:   [] Good    [] Fair    [x] Poor                          Dynamic:         [] Good    [] Fair    [x] Poor         Protective Extension:  [] Present    [] Delayed    [x] Absent      Rhomberg: EO 21 seconds, EC 4 seconds      Sharpened Rhomber seconds left anterior, 5 seconds right anterior     Behavior: [x] Cooperative    [] Impulsive    [] Agitated    [] Perseverative    [] Confused              Oriented x:4     Cognition: [] One Step Commands   [x] Multiple Commands   [] Displays Neglect [] R  [] L     Other:       Impaired Judgement:          [] Y    [x] N      Impaired Vision:blurred vision        [x] Y    [] N      Safety Awareness Deficits  [] Y    [x] N      Impaired Hearing                [] Y    [x] N      Able to Express Needs       [x] Y    [] N     Functional Testing  TUG Score: 29 seconds, one loss of balance requiring min A  30 second sit to stand: 5 reps      Other test /comments:     Falls efficacy scale 100/100    Short Term Goals: In 4 weeks  Patient will

## 2024-04-12 NOTE — THERAPY EVALUATION
PT DAILY TREATMENT NOTE/GENERAL EVAL 10-18    Patient Name: Hayley Bear  Date:2024  : 1975  [x]  Patient  Verified  Payor: BCBS / Plan: BCBS OUT OF STATE / Product Type: *No Product type* /    In time:1501  Out time:1550  Total Treatment Time (min): 49  Visit #: 1 of 16    Medicare/BCBS Only   Total Timed Codes (min):  49 1:1 Treatment Time:  49     Treatment Area: Multiple sclerosis [G35]    SUBJECTIVE  Patient reports she has difficulty with transfers requiring prolonged standing before able to ambulate. Patient denies having or using an assistive device but is agreeable to using a 4WW she reports currently using handheld assistance. Patient states she has had one fall in the past month landing on bed, no injury. Patient states symptoms started in 2022.    Pain Level (0-10 scale): /10  [x]Sharp  []Dull  [x]Achy []Burning [x]Throbbing []N&T []Other:  []constant [x]intermittent []improving []worsening [x]no change since onset    Any medication changes, allergies to medications, adverse drug reactions, diagnosis change, or new procedure performed?: [x] No    [] Yes (see summary sheet for update)      PLOF: Patient reports independent with ADL's and I'ADL's without device  Current function/ Deficits to PLOF: requires assist for ADL's, ambulation in community  Current symptoms/Complaints: pain, falls, weakness, gait instability  Previous Treatment/Compliance: previous physical therapy before diagnosis with minimal progression  Work Hx: moving towards disability   Living Situation: lives , one level home with 3 steps to enter with bilateral railings, tub shower without grab bars, no equipment in home.   Patient Goals/ Caregiver goals: To be able to walk independently in community  Barriers: [x]pain []financial []time []transportation [x]Other new diagnosis of MS  Motivation: good  Substance use: []Alcohol []Tobacco []other:   Cognition: A & O x 4    Other:  Barriers to

## 2024-04-16 ENCOUNTER — HOSPITAL ENCOUNTER (OUTPATIENT)
Age: 49
Setting detail: RECURRING SERIES
Discharge: HOME | End: 2024-04-19
Payer: COMMERCIAL

## 2024-04-16 PROCEDURE — 97110 THERAPEUTIC EXERCISES: CPT

## 2024-04-16 NOTE — PROGRESS NOTES
PT DAILY TREATMENT NOTE     Patient Name: Hayley Bear  Date:2024  : 1975  [x]  Patient  Verified  Payor: BCBS / Plan: BCBS OUT OF STATE / Product Type: *No Product type* /    In time:  Out time:1735  Total Treatment Time (min): 55  Total Timed Codes (min): 55  1:1 Treatment Time (min): 55   Visit #: 2     Treatment Area: Multiple sclerosis [G35]    SUBJECTIVE  Pt states her legs feel stiff once coming up from standing and has to think about getting them to move     Pain Level (0-10 scale): 9    Any medication changes, allergies to medications, adverse drug reactions, diagnosis change, or new procedure performed?: [x] No    [] Yes (see summary sheet for update)        OBJECTIVE        55 min Therapeutic Exercise:  [x] See flow sheet :   Rationale: increase ROM, increase strength, improve coordination, and improve balance to improve the patient’s ability to return to prior PLOF       Rationale:           With TE  TA   NR  GT   Misc Patient Education: [x] Review HEP    [] Progressed/Changed HEP based on:   [] positioning   [] body mechanics   [] transfers   [] heat/ice application          Pain Level (0-10 scale) post treatment: 6    ASSESSMENT/Changes in Function: Began session today introducing stepper for active warm up 10' followed by stregthening/stretching exercises which included seated marches, LAQ, heel / toe raises, glut sets, chair press ups, sit to stands with no UE support 10x. Introduced balance exercises in // bars for SLS 3-4 sec x4 each, tandem stance 7 sec 4x each, tandem walking with no UE support 4 bouts, Introduced step ups forward 2x10. Pt required SBA and vc for proper technique. Pt can perform some as listed with no UE support but needs // bars to grab when lose balance . Plan to cont per POC and progress as able     Patient will continue to benefit from skilled PT services to modify and progress therapeutic interventions, analyze and address functional

## 2024-04-17 ENCOUNTER — HOSPITAL ENCOUNTER (OUTPATIENT)
Age: 49
Discharge: HOME | End: 2024-04-20
Payer: COMMERCIAL

## 2024-04-17 DIAGNOSIS — G35 MULTIPLE SCLEROSIS (HCC): ICD-10-CM

## 2024-04-17 LAB
ALBUMIN SERPL-MCNC: 3.9 G/DL (ref 3.4–5)
ALBUMIN/GLOB SERPL: 0.8 (ref 0.8–1.7)
ALP SERPL-CCNC: 86 U/L (ref 45–117)
ALT SERPL-CCNC: 20 U/L (ref 13–56)
ANION GAP SERPL CALC-SCNC: 5 MMOL/L (ref 3–18)
AST SERPL W P-5'-P-CCNC: 12 U/L (ref 10–38)
BASOPHILS # BLD: 0.1 K/UL (ref 0–0.1)
BASOPHILS NFR BLD: 1 % (ref 0–2)
BILIRUB SERPL-MCNC: 0.4 MG/DL (ref 0.2–1)
BUN SERPL-MCNC: 9 MG/DL (ref 7–18)
BUN/CREAT SERPL: 9 (ref 12–20)
CA-I BLD-MCNC: 9.3 MG/DL (ref 8.5–10.1)
CHLORIDE SERPL-SCNC: 108 MMOL/L (ref 100–111)
CO2 SERPL-SCNC: 27 MMOL/L (ref 21–32)
CREAT SERPL-MCNC: 1.03 MG/DL (ref 0.6–1.3)
DIFFERENTIAL METHOD BLD: ABNORMAL
EOSINOPHIL # BLD: 0.4 K/UL (ref 0–0.4)
EOSINOPHIL NFR BLD: 5 % (ref 0–5)
ERYTHROCYTE [DISTWIDTH] IN BLOOD BY AUTOMATED COUNT: 12.8 % (ref 11.6–14.5)
GLOBULIN SER CALC-MCNC: 4.7 G/DL (ref 2–4)
GLUCOSE SERPL-MCNC: 83 MG/DL (ref 74–99)
HCT VFR BLD AUTO: 43.3 % (ref 35–45)
HGB BLD-MCNC: 14.2 G/DL (ref 12–16)
IMM GRANULOCYTES # BLD AUTO: 0 K/UL (ref 0–0.04)
IMM GRANULOCYTES NFR BLD AUTO: 0 % (ref 0–0.5)
LYMPHOCYTES # BLD: 1.9 K/UL (ref 0.9–3.6)
LYMPHOCYTES NFR BLD: 25 % (ref 21–52)
MCH RBC QN AUTO: 31.8 PG (ref 24–34)
MCHC RBC AUTO-ENTMCNC: 32.8 G/DL (ref 31–37)
MCV RBC AUTO: 96.9 FL (ref 78–100)
MONOCYTES # BLD: 0.4 K/UL (ref 0.05–1.2)
MONOCYTES NFR BLD: 5 % (ref 3–10)
NEUTS SEG # BLD: 4.7 K/UL (ref 1.8–8)
NEUTS SEG NFR BLD: 64 % (ref 40–73)
NRBC # BLD: 0 K/UL (ref 0–0.01)
NRBC BLD-RTO: 0 PER 100 WBC
PLATELET # BLD AUTO: 144 K/UL (ref 135–420)
PLATELET COMMENT: ABNORMAL
PMV BLD AUTO: 12.9 FL (ref 9.2–11.8)
POTASSIUM SERPL-SCNC: 3.9 MMOL/L (ref 3.5–5.5)
PROT SERPL-MCNC: 8.6 G/DL (ref 6.4–8.2)
RBC # BLD AUTO: 4.47 M/UL (ref 4.2–5.3)
RBC MORPH BLD: ABNORMAL
SODIUM SERPL-SCNC: 140 MMOL/L (ref 136–145)
WBC # BLD AUTO: 7.5 K/UL (ref 4.6–13.2)

## 2024-04-17 PROCEDURE — 36415 COLL VENOUS BLD VENIPUNCTURE: CPT

## 2024-04-17 PROCEDURE — 86704 HEP B CORE ANTIBODY TOTAL: CPT

## 2024-04-17 PROCEDURE — 86480 TB TEST CELL IMMUN MEASURE: CPT

## 2024-04-17 PROCEDURE — 85025 COMPLETE CBC W/AUTO DIFF WBC: CPT

## 2024-04-17 PROCEDURE — 80053 COMPREHEN METABOLIC PANEL: CPT

## 2024-04-17 PROCEDURE — 87340 HEPATITIS B SURFACE AG IA: CPT

## 2024-04-18 LAB
HBV CORE AB SERPL QL IA: NEGATIVE
HBV SURFACE AG SER QL: <0.1 INDEX
HBV SURFACE AG SER QL: NEGATIVE

## 2024-04-22 LAB
M TB IFN-G BLD-IMP: NEGATIVE
M TB IFN-G CD4+ T-CELLS BLD-ACNC: 0.09 IU/ML
M TBIFN-G CD4+ CD8+T-CELLS BLD-ACNC: 0.06 IU/ML
QUANTIFERON CRITERIA: NORMAL
QUANTIFERON MITOGEN VALUE: >10 IU/ML
QUANTIFERON NIL VALUE: 0.13 IU/ML

## 2024-04-25 ENCOUNTER — HOSPITAL ENCOUNTER (OUTPATIENT)
Age: 49
Setting detail: RECURRING SERIES
Discharge: HOME | End: 2024-04-28
Payer: COMMERCIAL

## 2024-04-25 PROCEDURE — 97110 THERAPEUTIC EXERCISES: CPT

## 2024-04-25 PROCEDURE — 97530 THERAPEUTIC ACTIVITIES: CPT

## 2024-04-25 NOTE — PROGRESS NOTES
min Therapeutic Activity:  [x]  See flow sheet :   Rationale: To improve balance and equilibrium.      With TE  TA   NR  GT   Misc Patient Education: [x] Review HEP    [] Progressed/Changed HEP based on:   [] positioning   [] body mechanics   [] transfers   [] heat/ice application        Pain Level (0-10 scale) post treatment: 6    ASSESSMENT/Changes in Function: Continued with POC working to address strength and balance deficits. Session began with an active warm up followed by STS. Pt was cued for equal weight distribution and breathing techniques. Standing marches completed in parallel bars with B UE support. Tactile cueing was provided to ensure proper form. Balance was tested with side stepping, NBOS with EC, and in-step. All were completed without UE support. Pt had multiple LOB, but was overcome with fear and anxiety. Plan to continue POC as able next visit.     Patient will continue to benefit from skilled PT services to modify and progress therapeutic interventions, analyze and address functional mobility deficits, analyze and address ROM deficits, analyze and address strength deficits, analyze and address soft tissue restrictions, analyze and cue for proper movement patterns, analyze and modify for postural abnormalities, and analyze and address imbalance/dizziness to attain remaining goals.     [x]  See Plan of Care  []  See progress note/recertification  []  See Discharge Summary       PLAN  [x]  Upgrade activities as tolerated     [x]  Continue plan of care  []  Update interventions per flow sheet       []  Discharge due to:_  []  Other:_      KEN Longoria  4/25/2024  4:59 PM

## 2024-05-01 ENCOUNTER — HOSPITAL ENCOUNTER (OUTPATIENT)
Age: 49
Setting detail: RECURRING SERIES
Discharge: HOME | End: 2024-05-04
Payer: COMMERCIAL

## 2024-05-01 PROCEDURE — 97530 THERAPEUTIC ACTIVITIES: CPT

## 2024-05-01 PROCEDURE — 97110 THERAPEUTIC EXERCISES: CPT

## 2024-05-01 NOTE — PROGRESS NOTES
PT DAILY TREATMENT NOTE     Patient Name: Hayley Bear  Date:2024  : 1975  [x]  Patient  Verified  Payor: BCBS / Plan: BCBS OUT OF STATE / Product Type: *No Product type* /    In time:1106  Out time:1157  Total Treatment Time (min): 51  Total Timed Codes (min): 41  1:1 Treatment Time (min): 41   Visit # 4      Treatment Area: Multiple sclerosis [G35]    SUBJECTIVE  Pt reports increased pain upon arrival today.   Pain Level (0-10 scale): 8/10  Any medication changes, allergies to medications, adverse drug reactions, diagnosis change, or new procedure performed?: [x] No    [] Yes (see summary sheet for update)        OBJECTIVE  Modality rationale: decrease pain to improve the patient’s ability to participate in session.   Min Type Additional Details    [] Estim: []Att   []Unatt  []TENS instruct                 []IFC  []Premod []NMES                       []Other:  []w/US   []w/ice   []w/heat  Position:  Location:    []  Traction: [] Cervical       []Lumbar                       [] Prone          []Supine                       []Intermittent   []Continuous Lbs:  [] before manual  [] after manual    []  Ultrasound: []Continuous   [] Pulsed                           []1MHz   []3MHz Location:  W/cm2:    []  Iontophoresis with dexamethasone         Location: [] Take home patch   [] In clinic   10 []  Ice     [x]  heat  []  Ice massage Position: seated with B LE propped  Location : B knees    []  Vasopneumatic Device Pressure: [] lo [] med [] hi   Temp: [] lo [] med [] hi   [x] Skin assessment post-treatment:  [x]intact []redness- no adverse reaction       []redness - adverse reaction:           31 min Therapeutic Exercise:  [x] See flow sheet :   Rationale: increase ROM, increase strength, improve coordination, and improve balance to improve the patient’s ability to maximize functional mobility independence, endurance, confidence    10 min Therapeutic Activity:  [x]  See flow sheet :

## 2024-05-09 ENCOUNTER — HOSPITAL ENCOUNTER (OUTPATIENT)
Age: 49
Setting detail: RECURRING SERIES
End: 2024-05-09
Payer: COMMERCIAL

## 2024-05-23 ENCOUNTER — HOSPITAL ENCOUNTER (OUTPATIENT)
Age: 49
Setting detail: RECURRING SERIES
Discharge: HOME | End: 2024-05-26
Payer: COMMERCIAL

## 2024-05-23 PROCEDURE — 97110 THERAPEUTIC EXERCISES: CPT

## 2024-05-23 NOTE — PROGRESS NOTES
PT DAILY TREATMENT NOTE 8-    Patient Name: Hayley Bear  Date:2024  : 1975  [x]  Patient  Verified  Payor: BCBS / Plan: BCBS OUT OF STATE / Product Type: *No Product type* /    In time:1557  Out time:1650  Total Treatment Time (min): 53  Total Timed Codes (min): 45  1:1 Treatment Time (min): 45   Visit # 5      Treatment Area: Multiple sclerosis [G35]    SUBJECTIVE  I'm doing okay. I have had a lot come up over the last 3 weeks. My MD wants to try a new medicine with me.  Pain Level (0-10 scale): 0/10  Any medication changes, allergies to medications, adverse drug reactions, diagnosis change, or new procedure performed?: [x] No    [] Yes (see summary sheet for update)        OBJECTIVE  Modality rationale: decrease pain and increase tissue extensibility to improve the patient’s ability to perform ADLs and pain modulation.   Min Type Additional Details    [] Estim: []Att   []Unatt  []TENS instruct                 []IFC  []Premod []NMES                       []Other:  []w/US   []w/ice   []w/heat  Position:  Location:    []  Traction: [] Cervical       []Lumbar                       [] Prone          []Supine                       []Intermittent   []Continuous Lbs:  [] before manual  [] after manual    []  Ultrasound: []Continuous   [] Pulsed                           []1MHz   []3MHz Location:  W/cm2:    []  Iontophoresis with dexamethasone         Location: [] Take home patch   [] In clinic   8 []  Ice     [x]  heat  []  Ice massage Position: seated  Location: B knee    []  Vasopneumatic Device Pressure: [] lo [] med [] hi   Temp: [] lo [] med [] hi   [x] Skin assessment post-treatment:  [x]intact []redness- no adverse reaction       []redness - adverse reaction:           45 min Therapeutic Exercise:  [] See flow sheet :   Rationale: improve coordination and improve balance to improve the patient’s ability to perform ADLs and increase safety in home and public environments.

## 2024-05-23 NOTE — THERAPY RECERTIFICATION
[facility] [place] [facility]  PHYSICAL THERAPY  [facility], [address] [place]  [address]  Phone: [phone]    Fax: [phone]   Progress Note/CONTINUED PLAN OF CARE for PHYSICAL THERAPY          Patient Name: Hayley Bear : 1975   Treatment/Medical Diagnosis: Multiple sclerosis [G35]   Onset Date:     Referral Source: Syeda Barraza MD Start of Care (SOC): 24   Prior Hospitalization: See Medical History Provider #: [id number]   Prior Level of Function: Independent with mobility ADL's and I'ADL's working    Comorbidities:    Medications: Verified on Patient Summary List   Visits from SOC: 5 Missed Visits: 1     Objective Measures:  Functional Mobility:     Transfers: SBA  Bed mobility: supervision  Gait/ Locomotion: supervision, slow velocity and difficulty with transitions  Assistive Device: no device, PT recommends 4WW  Recent falls: no falls since 2024     Posture: [] Poor    [x] Fair    [] Good    Describe:   forward head, scapular protraction     Gait: [] Normal    [x] Abnormal    Device:    none  Describe: wide base of support, small step length and shuffling steps          Strength (MMT):  Shoulder L (1-5) R (1-5)   Shoulder Flexion 4 4   Shoulder Ext 4+ 4+   Shoulder ABD 4 4   Shoulder IR 4 4   Shoulder ER 4+ 4      Elbow & Wrist L (1-5) R (1-5)   Elbow Flexion 4+ 4+   Elbow Extension 4 4                                         Hip L (1-5) R (1-5)   Hip Flexion 4 4   Hip Ext 4 4   Hip ABD 4 4   Hip ADD 4 4      Knee L (1-5) R (1-5)   Knee Flexion 4+ 4   Knee Extension 4+ 4+      Ankle L (1-5) R (1-5)   Ankle DF 4+ 4+   Ankle PF 4 4      Catch and release noted with difficulty initiating movement, ataxia throughout upper and lower extremity.  Balance/ Equilibrium:          Sitting Balance: Static:        [x] Good    [] Fair    [] Poor                          Dynamic:         [x] Good    [] Fair    [] Poor         Standing Balance:

## 2024-05-30 ENCOUNTER — HOSPITAL ENCOUNTER (OUTPATIENT)
Age: 49
Setting detail: RECURRING SERIES
End: 2024-05-30
Payer: COMMERCIAL

## 2024-05-30 PROCEDURE — 97110 THERAPEUTIC EXERCISES: CPT

## 2024-05-30 PROCEDURE — 97112 NEUROMUSCULAR REEDUCATION: CPT

## 2024-05-30 NOTE — PROGRESS NOTES
PT DAILY TREATMENT NOTE     Patient Name: Hayley Bear  Date:2024  : 1975  [x]  Patient  Verified  Payor: BCBS / Plan: BCBS OUT OF STATE / Product Type: *No Product type* /    In time:15:46  Out time:16:35  Total Treatment Time (min): 49  Total Timed Codes (min): 49  1:1 Treatment Time (min): 49   Visit # 6      Treatment Area: Multiple sclerosis [G35]    SUBJECTIVE  \"I'm doing okay. Monday was a bad day with being tired and in pain.\"  Pain Level (0-10 scale): 5/10  Any medication changes, allergies to medications, adverse drug reactions, diagnosis change, or new procedure performed?: [x] No    [] Yes (see summary sheet for update)        OBJECTIVE    27 min Therapeutic Exercise:  [] See flow sheet :   Rationale: increase ROM and increase strength to improve the patient’s ability to perform ADLs with greater ease and increase functional endurance.     22 min Neuromuscular Re-education:  []  See flow sheet :   Rationale: improve coordination, improve balance, and increase proprioception  to improve the patient’s ability to perform ADLs and increase safety.             With TE  TA   NR  GT   Misc Patient Education: [x] Review HEP    [] Progressed/Changed HEP based on:   [] positioning   [] body mechanics   [] transfers   [] heat/ice application          Pain Level (0-10 scale) post treatment: 6/10    ASSESSMENT/Changes in Function: Pt reported to session with report of increased fatigue earlier into the week, but minimal fatigue on this date. She began with functional LE strengthening exercises, then transitioned into balance interventions. Introduced marches with EC and SBA to emphasize proprioception. She ended session on stepper for 8 min for endurance. Monitored fatigue throughout session, and patient noted to have appropriate increase in fatigue during session.       Patient will continue to benefit from skilled PT services to modify and progress therapeutic interventions, analyze  and address functional mobility deficits, analyze and address ROM deficits, analyze and address strength deficits, analyze and modify for postural abnormalities, and analyze and address imbalance/dizziness to attain remaining goals.     [x]  See Plan of Care  []  See progress note/recertification  []  See Discharge Summary             PLAN  [x]  Upgrade activities as tolerated     [x]  Continue plan of care  []  Update interventions per flow sheet       []  Discharge due to:_  []  Other:_      Diaz Rose, PT, DPT 5/30/2024  4:25 PM

## 2024-06-04 ENCOUNTER — HOSPITAL ENCOUNTER (OUTPATIENT)
Age: 49
Setting detail: RECURRING SERIES
End: 2024-06-04
Payer: COMMERCIAL

## 2024-06-27 ENCOUNTER — HOSPITAL ENCOUNTER (OUTPATIENT)
Age: 49
Setting detail: RECURRING SERIES
Discharge: HOME | End: 2024-06-30
Payer: COMMERCIAL

## 2024-06-27 PROCEDURE — 97112 NEUROMUSCULAR REEDUCATION: CPT

## 2024-06-27 PROCEDURE — 97110 THERAPEUTIC EXERCISES: CPT

## 2024-06-27 NOTE — PROGRESS NOTES
PT DAILY TREATMENT NOTE     Patient Name: Hayley Bear  Date:2024  : 1975  [x]  Patient  Verified  Payor: BCBS / Plan: BCBS OUT OF STATE / Product Type: *No Product type* /    In time:1708  Out time:1808  Total Treatment Time (min): 60  Total Timed Codes (min): 60  1:1 Treatment Time (min): 60   Visit #: 7     Treatment Area: Multiple sclerosis [G35]    SUBJECTIVE  Pt arrives stating that she is still waiting to hear back for further testing from her last referral by her MD. Her chief complaint this session is that her legs feel heavy and both legs are painful and has minimal fatigue on this date.    Pain Level (0-10 scale): 7/10 B LE's    Any medication changes, allergies to medications, adverse drug reactions, diagnosis change, or new procedure performed?: [x] No    [] Yes (see summary sheet for update)        OBJECTIVE  Modality rationale: No modalities   Min Type Additional Details    [] Estim: []Att   []Unatt  []TENS instruct                 []IFC  []Premod []NMES                       []Other:  []w/US   []w/ice   []w/heat  Position:  Location:    []  Traction: [] Cervical       []Lumbar                       [] Prone          []Supine                       []Intermittent   []Continuous Lbs:  [] before manual  [] after manual    []  Ultrasound: []Continuous   [] Pulsed                           []1MHz   []3MHz Location:  W/cm2:    []  Iontophoresis with dexamethasone         Location: [] Take home patch   [] In clinic    []  Ice     []  heat  []  Ice massage Position:  Location:    []  Vasopneumatic Device Pressure: [] lo [] med [] hi   Temp: [] lo [] med [] hi   [] Skin assessment post-treatment:  []intact []redness- no adverse reaction       []redness - adverse reaction:           37 min Therapeutic Exercise:  [x] See flow sheet :   Rationale: increase ROM, increase strength, improve balance, and increase proprioception to improve the patient’s ability to return to prior level

## 2024-07-18 ENCOUNTER — TELEPHONE (OUTPATIENT)
Age: 49
End: 2024-07-18

## 2024-07-19 NOTE — TELEPHONE ENCOUNTER
Fell in room. Acted \"out of it\" and went to bed. Stopped responding, turned blue. Ambulance reported cardiogenic shock. No pulse (except carotid). Has  Pacemaker but no pacer spikes reported.   Returned patients call.  Patients name and  verified.   Patient states that she had a visit in March for her M.S. and that she has heard nothing since.  Patient asked if her medication was approved? Patient states that she had called and spoken to someone about this in April and they stated they would check on this and never called her back.  Patient wants to know if the OCREVIS was ever approved and what does she do from here.  Patient states that she has an upcoming follow-up and she hasn't heard anything from her previous visit so there is nothing to follow-up on she doesn't want to waste a visit.  Practice Manger aware of concern and is looking into this.  Nothing in system regarding prior auth and nothing regarding infusion center.  Dr. Barraza also being made aware of phone call

## 2024-08-20 ENCOUNTER — TELEPHONE (OUTPATIENT)
Age: 49
End: 2024-08-20

## 2024-08-20 NOTE — TELEPHONE ENCOUNTER
Call from patient stating that she needs answers regarding treatment. Previous notes in patients chart states that it was given to .

## 2024-08-21 ENCOUNTER — TELEPHONE (OUTPATIENT)
Age: 49
End: 2024-08-21

## 2024-08-21 NOTE — TELEPHONE ENCOUNTER
Christi Pillai completed the Ocrevus Infusion Form and it will be faxed to Infusion Center Today. (See Scan)

## 2024-08-29 ENCOUNTER — TELEPHONE (OUTPATIENT)
Age: 49
End: 2024-08-29

## 2024-08-29 NOTE — TELEPHONE ENCOUNTER
Infusion center asking for new forms to be submitted for patients infusion medication for MS.  (See Scan)

## 2024-09-05 PROBLEM — G35 MULTIPLE SCLEROSIS (HCC): Status: ACTIVE | Noted: 2024-09-05

## 2024-09-05 RX ORDER — EPINEPHRINE 1 MG/ML
0.3 INJECTION, SOLUTION, CONCENTRATE INTRAVENOUS PRN
OUTPATIENT
Start: 2024-09-11

## 2024-09-05 RX ORDER — SODIUM CHLORIDE 0.9 % (FLUSH) 0.9 %
5-40 SYRINGE (ML) INJECTION PRN
OUTPATIENT
Start: 2024-09-11

## 2024-09-05 RX ORDER — ACETAMINOPHEN 325 MG/1
650 TABLET ORAL ONCE
OUTPATIENT
Start: 2024-09-11 | End: 2024-09-11

## 2024-09-05 RX ORDER — ONDANSETRON 2 MG/ML
8 INJECTION INTRAMUSCULAR; INTRAVENOUS
OUTPATIENT
Start: 2024-09-11

## 2024-09-05 RX ORDER — HEPARIN 100 UNIT/ML
500 SYRINGE INTRAVENOUS PRN
OUTPATIENT
Start: 2024-09-11

## 2024-09-05 RX ORDER — DIPHENHYDRAMINE HYDROCHLORIDE 50 MG/ML
25 INJECTION INTRAMUSCULAR; INTRAVENOUS ONCE
OUTPATIENT
Start: 2024-09-11 | End: 2024-09-11

## 2024-09-05 RX ORDER — SODIUM CHLORIDE 9 MG/ML
5-250 INJECTION, SOLUTION INTRAVENOUS PRN
OUTPATIENT
Start: 2024-09-11

## 2024-09-05 RX ORDER — ALBUTEROL SULFATE 90 UG/1
4 INHALANT RESPIRATORY (INHALATION) PRN
OUTPATIENT
Start: 2024-09-11

## 2024-09-05 RX ORDER — DIPHENHYDRAMINE HYDROCHLORIDE 50 MG/ML
50 INJECTION INTRAMUSCULAR; INTRAVENOUS
OUTPATIENT
Start: 2024-09-11

## 2024-09-05 RX ORDER — SODIUM CHLORIDE 9 MG/ML
INJECTION, SOLUTION INTRAVENOUS CONTINUOUS
OUTPATIENT
Start: 2024-09-11

## 2024-09-05 RX ORDER — ACETAMINOPHEN 325 MG/1
650 TABLET ORAL
OUTPATIENT
Start: 2024-09-11

## 2024-09-05 RX ORDER — DIPHENHYDRAMINE HYDROCHLORIDE 50 MG/ML
50 INJECTION INTRAMUSCULAR; INTRAVENOUS ONCE
OUTPATIENT
Start: 2024-09-11 | End: 2024-09-11

## 2024-09-06 ENCOUNTER — CLINICAL DOCUMENTATION (OUTPATIENT)
Facility: HOSPITAL | Age: 49
End: 2024-09-06

## 2024-09-06 ENCOUNTER — TELEPHONE (OUTPATIENT)
Age: 49
End: 2024-09-06

## 2024-09-06 NOTE — TELEPHONE ENCOUNTER
[employer] Authorization called stating that the ocrevus has to go through [facility].

## 2024-09-09 ENCOUNTER — TELEPHONE (OUTPATIENT)
Age: 49
End: 2024-09-09

## 2024-09-09 NOTE — TELEPHONE ENCOUNTER
Received email stating patient Ocrevus has been denied.   Also received email from [facility] Insurance Authorization Specialist stating reasons for denial (see scan)   Paperwork highlighted and placed on Dr. Barraza's desk for further instruction.

## 2024-09-11 ENCOUNTER — HOSPITAL ENCOUNTER (OUTPATIENT)
Facility: HOSPITAL | Age: 49
Setting detail: INFUSION SERIES
End: 2024-09-11

## 2024-09-11 DIAGNOSIS — G35 MULTIPLE SCLEROSIS (HCC): Primary | ICD-10-CM

## 2024-09-13 ENCOUNTER — TELEPHONE (OUTPATIENT)
Age: 49
End: 2024-09-13

## 2024-09-16 ENCOUNTER — TELEPHONE (OUTPATIENT)
Age: 49
End: 2024-09-16

## 2024-10-01 ENCOUNTER — TELEPHONE (OUTPATIENT)
Age: 49
End: 2024-10-01

## 2024-10-01 NOTE — TELEPHONE ENCOUNTER
Letter written by Dr. Barraza for denial of OCREVUS to ask for reconsideration.  Letter and notes faxed (see Scan)

## 2024-10-11 ENCOUNTER — TELEPHONE (OUTPATIENT)
Age: 49
End: 2024-10-11

## 2024-10-14 ENCOUNTER — TELEPHONE (OUTPATIENT)
Age: 49
End: 2024-10-14

## 2024-10-14 NOTE — TELEPHONE ENCOUNTER
Representative from [facility] [place] called checking on the Ocrevus since it has been denied again after sending letter.   Representative asked if this case should be closed, unable to answer that and informed her that Dr. Barraza is out of office until after the 21st.   She is pending the case until the 25th and will call back to see what Dr. Barraza wishes to do.  Dr. Barraza being made aware.

## 2024-10-21 DIAGNOSIS — G35 MULTIPLE SCLEROSIS (HCC): Primary | ICD-10-CM

## 2024-10-21 RX ORDER — DIMETHYL FUMARATE 120 MG/1
120 CAPSULE ORAL 2 TIMES DAILY
Qty: 14 CAPSULE | Refills: 0 | Status: SHIPPED | OUTPATIENT
Start: 2024-10-21 | End: 2024-10-28

## 2024-10-21 RX ORDER — DIMETHYL FUMARATE 240 MG/1
240 CAPSULE ORAL 2 TIMES DAILY
Qty: 60 CAPSULE | Refills: 3 | Status: SHIPPED | OUTPATIENT
Start: 2024-10-21 | End: 2024-11-20

## 2024-10-23 ENCOUNTER — HOSPITAL ENCOUNTER (OUTPATIENT)
Age: 49
Setting detail: SPECIMEN
Discharge: HOME | End: 2024-10-26

## 2024-10-23 ENCOUNTER — OFFICE VISIT (OUTPATIENT)
Age: 49
End: 2024-10-23
Payer: COMMERCIAL

## 2024-10-23 VITALS
HEART RATE: 89 BPM | OXYGEN SATURATION: 98 % | HEIGHT: 60 IN | BODY MASS INDEX: 35.14 KG/M2 | TEMPERATURE: 99 F | WEIGHT: 179 LBS | DIASTOLIC BLOOD PRESSURE: 72 MMHG | SYSTOLIC BLOOD PRESSURE: 138 MMHG

## 2024-10-23 DIAGNOSIS — G35 MULTIPLE SCLEROSIS (HCC): Primary | ICD-10-CM

## 2024-10-23 DIAGNOSIS — G35 MULTIPLE SCLEROSIS (HCC): ICD-10-CM

## 2024-10-23 PROCEDURE — 99215 OFFICE O/P EST HI 40 MIN: CPT | Performed by: STUDENT IN AN ORGANIZED HEALTH CARE EDUCATION/TRAINING PROGRAM

## 2024-10-23 ASSESSMENT — ENCOUNTER SYMPTOMS
COUGH: 0
BACK PAIN: 1
NAUSEA: 0
SHORTNESS OF BREATH: 0
VOMITING: 0

## 2024-10-23 NOTE — PROGRESS NOTES
file   Occupational History    Not on file   Tobacco Use    Smoking status: Never     Passive exposure: Never    Smokeless tobacco: Never   Substance and Sexual Activity    Alcohol use: Not Currently    Drug use: Not Currently    Sexual activity: Not on file   Other Topics Concern    Not on file   Social History Narrative    Not on file     Social Determinants of Health     Financial Resource Strain: Medium Risk (3/27/2023)    Overall Financial Resource Strain (CARDIA)     Difficulty of Paying Living Expenses: Somewhat hard   Food Insecurity: Not on file (3/27/2023)   Recent Concern: Food Insecurity - Food Insecurity Present (3/27/2023)    Hunger Vital Sign     Worried About Running Out of Food in the Last Year: Sometimes true     Ran Out of Food in the Last Year: Often true   Transportation Needs: Unknown (3/27/2023)    PRAPARE - Transportation     Lack of Transportation (Medical): Not on file     Lack of Transportation (Non-Medical): No   Physical Activity: Not on file   Stress: Not on file   Social Connections: Not on file   Intimate Partner Violence: Not on file   Housing Stability: Unknown (3/27/2023)    Housing Stability Vital Sign     Unable to Pay for Housing in the Last Year: Not on file     Number of Places Lived in the Last Year: Not on file     Unstable Housing in the Last Year: No        No Known Allergies      Current Outpatient Medications   Medication Sig Dispense Refill    meclizine (ANTIVERT) 25 MG tablet Take 1 tablet by mouth 3 times daily as needed for Dizziness 60 tablet 3    vitamin B-12 (CYANOCOBALAMIN) 1000 MCG tablet Take 1 tablet by mouth daily      diazePAM (VALIUM) 5 MG tablet Take 1 tablet by mouth every 6 hours as needed for Anxiety.      vitamin D (ERGOCALCIFEROL) 1.25 MG (13365 UT) CAPS capsule TAKE 1 CAPSULE BY MOUTH EVERY 7 DAYS 13 capsule 3    rizatriptan (MAXALT) 10 MG tablet Take 1 tablet at onset of migraine headache and ay repeat in 2 hours if headache does not resolve.  Max

## 2024-10-25 ENCOUNTER — TELEPHONE (OUTPATIENT)
Age: 49
End: 2024-10-25

## 2024-10-25 NOTE — TELEPHONE ENCOUNTER
Erik nicolas representative called regarding closing ocrevus order , she can be reached [phone]

## 2024-10-28 LAB — JCPYV DNA SPEC QL NAA+PROBE: NEGATIVE

## 2024-11-08 ENCOUNTER — TELEPHONE (OUTPATIENT)
Age: 49
End: 2024-11-08

## 2024-11-08 NOTE — TELEPHONE ENCOUNTER
Pt.called stating that she needs the original forms she left with provider from 10/23 mailed to her

## 2024-11-18 ENCOUNTER — TELEPHONE (OUTPATIENT)
Age: 49
End: 2024-11-18

## 2025-02-10 RX ORDER — ERGOCALCIFEROL 1.25 MG/1
50000 CAPSULE, LIQUID FILLED ORAL
Qty: 13 CAPSULE | Refills: 3 | Status: SHIPPED | OUTPATIENT
Start: 2025-02-10

## 2025-02-12 ENCOUNTER — OFFICE VISIT (OUTPATIENT)
Facility: CLINIC | Age: 50
End: 2025-02-12
Payer: COMMERCIAL

## 2025-02-12 VITALS
RESPIRATION RATE: 18 BRPM | OXYGEN SATURATION: 100 % | DIASTOLIC BLOOD PRESSURE: 79 MMHG | WEIGHT: 175.6 LBS | HEIGHT: 60 IN | BODY MASS INDEX: 34.47 KG/M2 | SYSTOLIC BLOOD PRESSURE: 132 MMHG | TEMPERATURE: 98.2 F | HEART RATE: 75 BPM

## 2025-02-12 DIAGNOSIS — G35 MULTIPLE SCLEROSIS (HCC): ICD-10-CM

## 2025-02-12 PROCEDURE — 99213 OFFICE O/P EST LOW 20 MIN: CPT | Performed by: NURSE PRACTITIONER

## 2025-02-12 SDOH — ECONOMIC STABILITY: FOOD INSECURITY: WITHIN THE PAST 12 MONTHS, YOU WORRIED THAT YOUR FOOD WOULD RUN OUT BEFORE YOU GOT MONEY TO BUY MORE.: OFTEN TRUE

## 2025-02-12 SDOH — ECONOMIC STABILITY: FOOD INSECURITY: WITHIN THE PAST 12 MONTHS, THE FOOD YOU BOUGHT JUST DIDN'T LAST AND YOU DIDN'T HAVE MONEY TO GET MORE.: SOMETIMES TRUE

## 2025-02-12 ASSESSMENT — PATIENT HEALTH QUESTIONNAIRE - PHQ9
SUM OF ALL RESPONSES TO PHQ QUESTIONS 1-9: 2
SUM OF ALL RESPONSES TO PHQ QUESTIONS 1-9: 2
1. LITTLE INTEREST OR PLEASURE IN DOING THINGS: SEVERAL DAYS
SUM OF ALL RESPONSES TO PHQ9 QUESTIONS 1 & 2: 2
SUM OF ALL RESPONSES TO PHQ QUESTIONS 1-9: 2
SUM OF ALL RESPONSES TO PHQ QUESTIONS 1-9: 2
2. FEELING DOWN, DEPRESSED OR HOPELESS: SEVERAL DAYS

## 2025-02-12 NOTE — PATIENT INSTRUCTIONS
[facility] Utility - Financial Resources*  (Call [employer]/211 if need more resources.)    Utilities  Common[employer]  What they offer: Partnership with the [facility] of . Assist with finding and applying for government funded programs and benefits. You can also update your benefits or report changes through [employer].  Website: https://www.Doctors Togethervir[place]iaVenuu/  Phone Number: 833-5CALLVA (469-367-9224)    [employer] [place] [employer]  What they offer: EnergyShare is [facility]'s energy assistance program of last resort for  anyone who faces financial hardships from unemployment or family crisis.  Phone Number: 336.191.6210  Address: [address] [place] [address]  Website: https://www.bigclix.com/[place]/billing/billing-options/energyshare    Energy Assistance Programs (EAP) - Department of   What they offer: EAP assists low income households in meeting their immediate home energy needs.  Phone Number: 144.776.9738 or contact your local department of   Website: https://www.dss.[place].gov/benefit/ea/    [facility]  What they offer: Bill and utility assistance  Phone number: 152.625.5623.  Website: https://MileIQ    [employer]  What they offer: Utility, rent, mortgage assistance   Phone number: 344.298.5533.  Website: https://[employer].org  Regional Housing Crisis Hotline  Can assist with rent assistance, eviction prevention and utility bills.  Phone Number: 627.828.7694    Medical  [employer] Financial Assistance  What they offer: The [employer] Financial Assistance Program helps uninsured patients who do not qualify for government-sponsored health insurance and cannot afford to pay for their medical care. Insured patient may also qualify for assistance based on family income, family size, and medical needs.  Phone Number: 326.819.2008  How to apply for the [employer] Financial Assistance

## 2025-02-12 NOTE — PROGRESS NOTES
Hayley Bear presents today for   Chief Complaint   Patient presents with    Annual Exam     Annual exam        Is someone accompanying this pt? yes    Is the patient using any DME equipment during OV? no    Health Maintenance Due   Topic Date Due    Hepatitis C screen  Never done    Hepatitis B vaccine (1 of 3 - 19+ 3-dose series) Never done    DTaP/Tdap/Td vaccine (1 - Tdap) Never done    Depression Screen  03/27/2024    Shingles vaccine (1 of 2) Never done    Pneumococcal 50+ years Vaccine (1 of 1 - PCV) Never done    Flu vaccine (1) Never done    COVID-19 Vaccine (1 - 2024-25 season) Never done    Breast cancer screen  02/14/2025         \"Have you been to the ER, urgent care clinic since your last visit?  Hospitalized since your last visit?\"    NO    “Have you seen or consulted any other health care providers outside our system since your last visit?”    NO             
    Health Maintenance   Topic Date Due    Hepatitis C screen  Never done    Hepatitis B vaccine (1 of 3 - 19+ 3-dose series) Never done    DTaP/Tdap/Td vaccine (1 - Tdap) Never done    Depression Screen  03/27/2024    Shingles vaccine (1 of 2) Never done    Pneumococcal 50+ years Vaccine (1 of 1 - PCV) Never done    Flu vaccine (1) Never done    COVID-19 Vaccine (1 - 2024-25 season) Never done    Breast cancer screen  02/14/2025    Colorectal Cancer Screen  03/02/2026    Cervical cancer screen  02/20/2028    Lipids  07/31/2028    HIV screen  Completed    Hepatitis A vaccine  Aged Out    Hib vaccine  Aged Out    Polio vaccine  Aged Out    Meningococcal (ACWY) vaccine  Aged Out    Diabetes screen  Discontinued       There is no immunization history on file for this patient.    Physical Exam  Vital signs:   Vitals:    02/12/25 1517   BP: 132/79   Site: Left Upper Arm   Position: Sitting   Cuff Size: Medium Adult   Pulse: 75   Resp: 18   Temp: 98.2 °F (36.8 °C)   TempSrc: Temporal   SpO2: 100%   Weight: 79.7 kg (175 lb 9.6 oz)   Height: 1.524 m (5')       Laboratory/Tests:  No visits with results within 3 Month(s) from this visit.   Latest known visit with results is:   Office Visit on 10/23/2024   Component Date Value Ref Range Status    CHI Virus DNA by PCR 10/23/2024 Negative  Negative Final    Comment: No JCV DNA detected  This test was developed and its performance characteristics determined  by [facility].  It has not been cleared or approved by the Food and Drug  Administration.  The FDA has determined that such clearance or  approval is not necessary.       Patient reported examination room with her , Carlos Bear, which is her primary caregiver.    Patient is followed by Neurology, Dr. Syeda Barraza, due to recent diagnosis of Multiple Sclerosis.  She is currently awaiting treatment approval of medication to manage her Multiple Sclerosis.    During this visit the patient expressed her frustration

## 2025-03-06 ENCOUNTER — OFFICE VISIT (OUTPATIENT)
Age: 50
End: 2025-03-06
Payer: COMMERCIAL

## 2025-03-06 VITALS
SYSTOLIC BLOOD PRESSURE: 133 MMHG | DIASTOLIC BLOOD PRESSURE: 84 MMHG | BODY MASS INDEX: 33.04 KG/M2 | WEIGHT: 175 LBS | TEMPERATURE: 97 F | HEART RATE: 61 BPM | HEIGHT: 61 IN | OXYGEN SATURATION: 100 %

## 2025-03-06 DIAGNOSIS — G35 MULTIPLE SCLEROSIS (HCC): ICD-10-CM

## 2025-03-06 PROCEDURE — 99215 OFFICE O/P EST HI 40 MIN: CPT | Performed by: STUDENT IN AN ORGANIZED HEALTH CARE EDUCATION/TRAINING PROGRAM

## 2025-03-06 RX ORDER — DIMETHYL FUMARATE 240 MG/1
240 CAPSULE ORAL 2 TIMES DAILY
Qty: 60 CAPSULE | Refills: 0 | Status: SHIPPED | OUTPATIENT
Start: 2025-03-06 | End: 2025-04-05

## 2025-03-06 RX ORDER — DIMETHYL FUMARATE 120 MG/1
120 CAPSULE ORAL 2 TIMES DAILY
Qty: 14 CAPSULE | Refills: 0 | Status: SHIPPED | OUTPATIENT
Start: 2025-03-06 | End: 2025-03-13

## 2025-03-06 ASSESSMENT — ENCOUNTER SYMPTOMS
BACK PAIN: 1
SHORTNESS OF BREATH: 0
COUGH: 0
VOMITING: 0
NAUSEA: 0

## 2025-03-06 NOTE — PROGRESS NOTES
Hayley Bear is a 49 y.o. female (: 1975) presenting to address:    Chief Complaint   Patient presents with    Follow-up     Multiple sclerosis        Medication list and allergies have been reviewed with Hayley Bear and updated as of today's date.     I have gone over all Medical, Surgical and Social History with Hayley Gaby Bear and updated/added the information accordingly.       1. Have you been to the ER, Urgent Care or Hospitalized since your last visit? No      2. Have you followed up with your PCP or any other Physicians since your procedure/ last office visit?   Yes    
hepatitis B core antibody, and QuantiFERON gold antibody are negative.         I spent 40 minutes with the patient in face-to-face consultation, with 25  minutes spent in counseling and coordination of care as described above.       PLEASE NOTE:   This document has been produced using voice recognition software. Unrecognized errors in transcription may be present.

## 2025-03-10 ENCOUNTER — TELEPHONE (OUTPATIENT)
Age: 50
End: 2025-03-10

## 2025-03-10 DIAGNOSIS — G35 MULTIPLE SCLEROSIS (HCC): ICD-10-CM

## 2025-03-10 RX ORDER — DIMETHYL FUMARATE 240 MG/1
240 CAPSULE ORAL 2 TIMES DAILY
Qty: 60 CAPSULE | Refills: 0 | Status: CANCELLED | OUTPATIENT
Start: 2025-03-10 | End: 2025-04-09

## 2025-03-13 DIAGNOSIS — G35 MULTIPLE SCLEROSIS (HCC): ICD-10-CM

## 2025-03-13 RX ORDER — DIMETHYL FUMARATE 240 MG/1
240 CAPSULE ORAL 2 TIMES DAILY
Qty: 60 CAPSULE | Refills: 0 | OUTPATIENT
Start: 2025-03-13 | End: 2025-04-12

## 2025-03-13 RX ORDER — DIMETHYL FUMARATE 120 MG/1
120 CAPSULE ORAL 2 TIMES DAILY
Qty: 14 CAPSULE | Refills: 0 | OUTPATIENT
Start: 2025-03-13 | End: 2025-03-20

## 2025-04-23 ENCOUNTER — TELEPHONE (OUTPATIENT)
Age: 50
End: 2025-04-23

## 2025-05-08 ENCOUNTER — TELEPHONE (OUTPATIENT)
Age: 50
End: 2025-05-08

## 2025-05-08 NOTE — TELEPHONE ENCOUNTER
Patient called regarding forms for [employer], says those forms will determine if she is eligible for the program

## 2025-07-23 ENCOUNTER — TELEPHONE (OUTPATIENT)
Age: 50
End: 2025-07-23

## 2025-07-23 DIAGNOSIS — G35 MULTIPLE SCLEROSIS (HCC): ICD-10-CM

## 2025-07-23 RX ORDER — DIMETHYL FUMARATE 240 MG/1
240 CAPSULE ORAL 2 TIMES DAILY
Qty: 60 CAPSULE | Refills: 2 | Status: SHIPPED | OUTPATIENT
Start: 2025-07-23 | End: 2025-08-22

## 2025-08-18 ENCOUNTER — OFFICE VISIT (OUTPATIENT)
Facility: CLINIC | Age: 50
End: 2025-08-18
Payer: COMMERCIAL

## 2025-08-18 VITALS
WEIGHT: 174 LBS | RESPIRATION RATE: 17 BRPM | DIASTOLIC BLOOD PRESSURE: 88 MMHG | BODY MASS INDEX: 32.85 KG/M2 | SYSTOLIC BLOOD PRESSURE: 138 MMHG | HEART RATE: 81 BPM | OXYGEN SATURATION: 100 % | TEMPERATURE: 97.8 F | HEIGHT: 61 IN

## 2025-08-18 DIAGNOSIS — E55.9 VITAMIN D DEFICIENCY: Primary | ICD-10-CM

## 2025-08-18 DIAGNOSIS — G43.111 INTRACTABLE MIGRAINE WITH AURA WITH STATUS MIGRAINOSUS: ICD-10-CM

## 2025-08-18 DIAGNOSIS — F41.9 ANXIETY: ICD-10-CM

## 2025-08-18 PROCEDURE — 99214 OFFICE O/P EST MOD 30 MIN: CPT | Performed by: NURSE PRACTITIONER

## 2025-08-18 RX ORDER — ERGOCALCIFEROL 1.25 MG/1
50000 CAPSULE, LIQUID FILLED ORAL
Qty: 13 CAPSULE | Refills: 3 | Status: CANCELLED | OUTPATIENT
Start: 2025-08-18

## 2025-08-18 RX ORDER — RIZATRIPTAN BENZOATE 10 MG/1
TABLET ORAL
Qty: 20 TABLET | Refills: 0 | Status: CANCELLED | OUTPATIENT
Start: 2025-08-18

## 2025-08-18 RX ORDER — RIZATRIPTAN BENZOATE 10 MG/1
TABLET ORAL
Qty: 20 TABLET | Refills: 3 | Status: SHIPPED | OUTPATIENT
Start: 2025-08-18

## 2025-08-18 RX ORDER — ESCITALOPRAM OXALATE 5 MG/1
5 TABLET ORAL DAILY
Qty: 90 TABLET | Refills: 1 | Status: CANCELLED | OUTPATIENT
Start: 2025-08-18

## 2025-08-18 RX ORDER — ERGOCALCIFEROL 1.25 MG/1
50000 CAPSULE, LIQUID FILLED ORAL
Qty: 13 CAPSULE | Refills: 3 | Status: SHIPPED | OUTPATIENT
Start: 2025-08-18

## 2025-08-18 RX ORDER — ESCITALOPRAM OXALATE 5 MG/1
5 TABLET ORAL DAILY
Qty: 90 TABLET | Refills: 1 | Status: SHIPPED | OUTPATIENT
Start: 2025-08-18

## 2025-09-03 ENCOUNTER — OFFICE VISIT (OUTPATIENT)
Age: 50
End: 2025-09-03
Payer: COMMERCIAL

## 2025-09-03 VITALS
HEART RATE: 69 BPM | TEMPERATURE: 97.3 F | OXYGEN SATURATION: 100 % | HEIGHT: 61 IN | BODY MASS INDEX: 33.23 KG/M2 | SYSTOLIC BLOOD PRESSURE: 157 MMHG | WEIGHT: 176 LBS | DIASTOLIC BLOOD PRESSURE: 79 MMHG

## 2025-09-03 DIAGNOSIS — G35 RELAPSING REMITTING MULTIPLE SCLEROSIS (HCC): Primary | ICD-10-CM

## 2025-09-03 PROCEDURE — 99215 OFFICE O/P EST HI 40 MIN: CPT | Performed by: STUDENT IN AN ORGANIZED HEALTH CARE EDUCATION/TRAINING PROGRAM

## 2025-09-03 RX ORDER — OCRELIZUMAB 300 MG/10ML
INJECTION INTRAVENOUS
Qty: 3600 ML | Refills: 0 | Status: SHIPPED | OUTPATIENT
Start: 2025-09-03

## 2025-09-03 RX ORDER — LORAZEPAM 2 MG/1
2 TABLET ORAL ONCE
Qty: 1 TABLET | Refills: 0 | Status: SHIPPED | OUTPATIENT
Start: 2025-09-03 | End: 2025-09-03

## 2025-09-03 ASSESSMENT — ENCOUNTER SYMPTOMS
NAUSEA: 1
BACK PAIN: 1
SHORTNESS OF BREATH: 0
VOMITING: 0
COUGH: 0